# Patient Record
Sex: FEMALE | Race: ASIAN | Employment: FULL TIME | RURAL
[De-identification: names, ages, dates, MRNs, and addresses within clinical notes are randomized per-mention and may not be internally consistent; named-entity substitution may affect disease eponyms.]

---

## 2017-01-05 ENCOUNTER — OFFICE VISIT (OUTPATIENT)
Dept: INTERNAL MEDICINE CLINIC | Age: 52
End: 2017-01-05

## 2017-01-05 VITALS
HEART RATE: 80 BPM | SYSTOLIC BLOOD PRESSURE: 149 MMHG | DIASTOLIC BLOOD PRESSURE: 97 MMHG | RESPIRATION RATE: 16 BRPM | OXYGEN SATURATION: 95 % | TEMPERATURE: 96.1 F | WEIGHT: 185 LBS | HEIGHT: 63 IN | BODY MASS INDEX: 32.78 KG/M2

## 2017-01-05 DIAGNOSIS — H60.312 DIFFUSE OTITIS EXTERNA OF LEFT EAR, UNSPECIFIED CHRONICITY: Primary | ICD-10-CM

## 2017-01-05 DIAGNOSIS — M72.2 PLANTAR FASCIA SYNDROME: ICD-10-CM

## 2017-01-05 DIAGNOSIS — R63.5 WEIGHT GAIN: ICD-10-CM

## 2017-01-05 DIAGNOSIS — J30.1 NON-SEASONAL ALLERGIC RHINITIS DUE TO POLLEN: ICD-10-CM

## 2017-01-05 RX ORDER — CETIRIZINE HCL 10 MG
TABLET ORAL
COMMUNITY
End: 2017-08-10 | Stop reason: ALTCHOICE

## 2017-01-05 RX ORDER — NEOMYCIN SULFATE, POLYMYXIN B SULFATE AND HYDROCORTISONE 10; 3.5; 1 MG/ML; MG/ML; [USP'U]/ML
3 SUSPENSION/ DROPS AURICULAR (OTIC) 3 TIMES DAILY
Qty: 10 ML | Refills: 0 | Status: SHIPPED | OUTPATIENT
Start: 2017-01-05 | End: 2017-01-06 | Stop reason: ALTCHOICE

## 2017-01-05 NOTE — PROGRESS NOTES
HISTORY OF PRESENT ILLNESS  Sadaf Shea is a 46 y.o. female. Ear Pain   The history is provided by the patient. This is a new problem. Episode onset: 1 week. The problem occurs hourly. The problem has been gradually improving. Associated symptoms comments: Left ear pain right OK  Rhinorrhea yellow . The symptoms are relieved by medications. Treatments tried: top witch hazel to ear Vesta pot. The treatment provided mild relief.    has gained some weight recently and would like her thyroid checked. Some complaints of both feet hurting times weeks pain first thing in the morning heel regions. Also relates her dog stepped on her foot. Current Outpatient Prescriptions   Medication Sig Dispense Refill    cetirizine (ZYRTEC) 10 mg tablet Take  by mouth.  loratadine (CLARITIN) 10 mg tablet Take 10 mg by mouth.  albuterol (PROVENTIL HFA, VENTOLIN HFA, PROAIR HFA) 90 mcg/actuation inhaler Take 1 Puff by inhalation every six (6) hours as needed for Wheezing. 1 Inhaler 1    hydrocortisone (HYTONE) 2.5 % topical cream Apply  to affected area two (2) times a day. use thin layer 30 g 1    fluticasone (FLONASE) 50 mcg/actuation nasal spray USE TWO SPRAYS IN EACH NOSTRIL EVERY DAY 1 Bottle 12    OTHER,NON-FORMULARY, 1 Cap daily. Greens herbals       ofloxacin (FLOXIN) 0.3 % otic solution Administer 5 Drops in left ear two (2) times a day. 5 mL 0       Social History     Social History    Marital status:      Spouse name: N/A    Number of children: 2    Years of education: N/A     Occupational History     [employer]     Social History Main Topics    Smoking status: Never Smoker    Smokeless tobacco: Never Used    Alcohol use No    Drug use: No    Sexual activity: Not Currently     Other Topics Concern    Not on file     Social History Narrative     2 kids       Review of Systems   Constitutional: Negative for fever. HENT: Positive for ear pain.     Gastrointestinal: Positive for nausea. Negative for vomiting. Physical Exam  Visit Vitals    BP (!) 149/97 (BP 1 Location: Left arm, BP Patient Position: Sitting)    Pulse 80    Temp 96.1 °F (35.6 °C) (Oral)    Resp 16    Ht 5' 2.5\" (1.588 m)    Wt 185 lb (83.9 kg)    LMP  (Approximate)    SpO2 95%    BMI 33.3 kg/m2       WDWN NAD  TM clear, throat wnl  Neck no adenopathy  Heart RRR no C/M/R  Lungs CTA  Abdo soft non tender  Ext No redness swelling or edema    ASSESSMENT and PLAN  Encounter Diagnoses   Name Primary?  Diffuse otitis externa of left ear, unspecified chronicity Yes    Weight gain     Non-seasonal allergic rhinitis due to pollen     Plantar fascia syndrome      Orders Placed This Encounter    TSH 3RD GENERATION    CBC W/O DIFF    METABOLIC PANEL, BASIC    T4, FREE    cetirizine (ZYRTEC) 10 mg tablet    DISCONTD: neomycin-polymyxin-hydrocortisone, buffered, (PEDIOTIC) 3.5-10,000-1 mg/mL-unit/mL-% otic suspension    ofloxacin drops prescribed for her ear 4 drops twice daily for a week. Discussed plantar fasciitis recommended insoles and exercises. Follow-up Disposition:  Return in about 6 months (around 7/5/2017), or if symptoms worsen or fail to improve.

## 2017-01-05 NOTE — MR AVS SNAPSHOT
Visit Information Date & Time Provider Department Dept. Phone Encounter #  
 1/5/2017  9:00 AM Baldomero Oropeza MD [place] [id number] [phone] [id number] Follow-up Instructions Return in about 6 months (around 7/5/2017), or if symptoms worsen or fail to improve. Upcoming Health Maintenance Date Due INFLUENZA AGE 9 TO ADULT 4/30/2017* PAP AKA CERVICAL CYTOLOGY 7/24/2017 FOBT Q 1 YEAR AGE 50-75 8/16/2017 BREAST CANCER SCRN MAMMOGRAM 9/8/2018 DTaP/Tdap/Td series (2 - Td) 7/24/2024 *Topic was postponed. The date shown is not the original due date. Allergies as of 1/5/2017  Review Complete On: 1/5/2017 By: Baldomero Oropeza MD  
  
 Severity Noted Reaction Type Reactions Codeine  08/22/2013    Itching Current Immunizations  Reviewed on 11/15/2016 Name Date Tdap 7/24/2014 Not reviewed this visit You Were Diagnosed With   
  
 Codes Comments Diffuse otitis externa of left ear, unspecified chronicity    -  Primary ICD-10-CM: J12.295 ICD-9-CM: 380.10 Weight gain     ICD-10-CM: R63.5 ICD-9-CM: 783.1 Non-seasonal allergic rhinitis due to pollen     ICD-10-CM: J30.1 ICD-9-CM: 477.0 Plantar fascia syndrome     ICD-10-CM: M72.2 ICD-9-CM: 728.71 Vitals BP Pulse Temp Resp Height(growth percentile) Weight(growth percentile) (!) 149/97 (BP 1 Location: Left arm, BP Patient Position: Sitting) 80 96.1 °F (35.6 °C) (Oral) 16 5' 2.5\" (1.588 m) 185 lb (83.9 kg) LMP SpO2 BMI OB Status Smoking Status (Approximate) 95% 33.3 kg/m2 Hysterectomy Never Smoker BMI and BSA Data Body Mass Index Body Surface Area  
 33.3 kg/m 2 1.92 m 2 Preferred Pharmacy Pharmacy Name Phone [facility] [address] & [facility] [phone] Your Updated Medication List  
  
   
This list is accurate as of: 1/5/17 10:09 AM.  Always use your most recent med list.  
  
  
  
  
 albuterol 90 mcg/actuation inhaler Commonly known as:  PROVENTIL HFA, VENTOLIN HFA, PROAIR HFA Take 1 Puff by inhalation every six (6) hours as needed for Wheezing. fluticasone 50 mcg/actuation nasal spray Commonly known as:  FLONASE  
USE TWO SPRAYS IN EACH NOSTRIL EVERY DAY  
  
 hydrocortisone 2.5 % topical cream  
Commonly known as:  HYTONE Apply  to affected area two (2) times a day. use thin layer  
  
 loratadine 10 mg tablet Commonly known as:  Jearline Rudolph Take 10 mg by mouth. neomycin-polymyxin-hydrocortisone (buffered) 3.5-10,000-1 mg/mL-unit/mL-% otic suspension Commonly known as:  Allayne Brian Administer 3 Drops in left ear three (3) times daily for 7 days. OTHER(NON-FORMULARY) 1 Cap daily. Greens herbals ZyrTEC 10 mg tablet Generic drug:  cetirizine Take  by mouth. Prescriptions Sent to Pharmacy Refills  
 neomycin-polymyxin-hydrocortisone, buffered, (PEDIOTIC) 3.5-10,000-1 mg/mL-unit/mL-% otic suspension 0 Sig: Administer 3 Drops in left ear three (3) times daily for 7 days. Class: Normal  
 Pharmacy: [place] [address] [address] [facility] Ph #: [phone] Route: Left Ear Follow-up Instructions Return in about 6 months (around 7/5/2017), or if symptoms worsen or fail to improve. Patient Instructions Plantar Fasciitis: Exercises Your Care Instructions Here are some examples of typical rehabilitation exercises for your condition. Start each exercise slowly. Ease off the exercise if you start to have pain. Your doctor or physical therapist will tell you when you can start these exercises and which ones will work best for you. How to do the exercises Note: Each exercise should create a pulling feeling but should not cause pain. Towel stretch 1. Sit with your legs extended and knees straight. 2. Place a towel around your foot just under the toes. 3. Hold each end of the towel in each hand, with your hands above your knees. 4. Pull back with the towel so that your foot stretches toward you. 5. Hold the position for at least 15 to 30 seconds. 6. Repeat 2 to 4 times a session, up to 5 sessions a day. Calf stretch Note: This exercise stretches the muscles at the back of the lower leg (the calf) and the Achilles tendon. Do this exercise 3 or 4 times a day, 5 days a week. 1. Stand facing a wall with your hands on the wall at about eye level. Put the leg you want to stretch about a step behind your other leg. 2. Keeping your back heel on the floor, bend your front knee until you feel a stretch in the back leg. 3. Hold the stretch for 15 to 30 seconds. Repeat 2 to 4 times. Plantar fascia and calf stretch Note: Stretching the plantar fascia and calf muscles can increase flexibility and decrease heel pain. You can do this exercise several times each day and before and after activity. 1. Stand on a step as shown above. Be sure to hold on to the banister. 2. Slowly let your heels down over the edge of the step as you relax your calf muscles. You should feel a gentle stretch across the bottom of your foot and up the back of your leg to your knee. 3. Hold the stretch about 15 to 30 seconds, and then tighten your calf muscle a little to bring your heel back up to the level of the step. Repeat 2 to 4 times. Towel curls 1. While sitting, place your foot on a towel on the floor and scrunch the towel toward you with your toes. 2. Then, also using your toes, push the towel away from you. Note: Make this exercise more challenging by placing a weighted object, such as a soup can, on the other end of the towel. [place] pickups 1. Put marbles on the floor next to a cup. 
2. Using your toes, try to lift the marbles up from the floor and put them in the cup. Follow-up care is a key part of your treatment and safety.  Be sure to make and go to all appointments, and call your doctor if you are having problems. It's also a good idea to know your test results and keep a list of the medicines you take. Where can you learn more? Go to http://nely-sandie.info/. Amy Lombardi in the search box to learn more about \"Plantar Fasciitis: Exercises. \" Current as of: May 23, 2016 Content Version: 11.1 © 8165-0375 [employer]. Care instructions adapted under license by [employer] (which disclaims liability or warranty for this information). If you have questions about a medical condition or this instruction, always ask your healthcare professional. [address] any warranty or liability for your use of this information. Introducing [facility]! Ken Hargrove introduces Travelogy patient portal. Now you can access parts of your medical record, email your doctor's office, and request medication refills online. 1. In your internet browser, go to [email or web address] 2. Click on the First Time User? Click Here link in the Sign In box. You will see the New Member Sign Up page. 3. Enter your Travelogy Access Code exactly as it appears below. You will not need to use this code after youve completed the sign-up process. If you do not sign up before the expiration date, you must request a new code. · Travelogy Access Code: [email or web address] Expires: 2/13/2017  4:19 PM 
 
4. Enter the last four digits of your Social Security Number (xxxx) and Date of Birth (mm/dd/yyyy) as indicated and click Submit. You will be taken to the next sign-up page. 5. Create a Piximt ID. This will be your Travelogy login ID and cannot be changed, so think of one that is secure and easy to remember. 6. Create a Travelogy password. You can change your password at any time. 7. Enter your Password Reset Question and Answer. This can be used at a later time if you forget your password. 8. Enter your e-mail address. You will receive e-mail notification when new information is available in [address] 9. Click Sign Up. You can now view and download portions of your medical record. 10. Click the Download Summary menu link to download a portable copy of your medical information. If you have questions, please visit the Frequently Asked Questions section of the [employer] website. Remember, [employer]igm is NOT to be used for urgent needs. For medical emergencies, dial 911. Now available from your iPhone and Android! Please provide this summary of care documentation to your next provider. Your primary care clinician is listed as Jacqueline Gupta. If you have any questions after today's visit, please call [phone].

## 2017-01-05 NOTE — PATIENT INSTRUCTIONS
Plantar Fasciitis: Exercises  Your Care Instructions  Here are some examples of typical rehabilitation exercises for your condition. Start each exercise slowly. Ease off the exercise if you start to have pain. Your doctor or physical therapist will tell you when you can start these exercises and which ones will work best for you. How to do the exercises  Note: Each exercise should create a pulling feeling but should not cause pain. Towel stretch    1. Sit with your legs extended and knees straight. 2. Place a towel around your foot just under the toes. 3. Hold each end of the towel in each hand, with your hands above your knees. 4. Pull back with the towel so that your foot stretches toward you. 5. Hold the position for at least 15 to 30 seconds. 6. Repeat 2 to 4 times a session, up to 5 sessions a day. Calf stretch    Note: This exercise stretches the muscles at the back of the lower leg (the calf) and the Achilles tendon. Do this exercise 3 or 4 times a day, 5 days a week. 1. Stand facing a wall with your hands on the wall at about eye level. Put the leg you want to stretch about a step behind your other leg. 2. Keeping your back heel on the floor, bend your front knee until you feel a stretch in the back leg. 3. Hold the stretch for 15 to 30 seconds. Repeat 2 to 4 times. Plantar fascia and calf stretch    Note: Stretching the plantar fascia and calf muscles can increase flexibility and decrease heel pain. You can do this exercise several times each day and before and after activity. 1. Stand on a step as shown above. Be sure to hold on to the banister. 2. Slowly let your heels down over the edge of the step as you relax your calf muscles. You should feel a gentle stretch across the bottom of your foot and up the back of your leg to your knee. 3. Hold the stretch about 15 to 30 seconds, and then tighten your calf muscle a little to bring your heel back up to the level of the step.  Repeat 2 to 4 times. Towel curls    1. While sitting, place your foot on a towel on the floor and scrunch the towel toward you with your toes. 2. Then, also using your toes, push the towel away from you. Note: Make this exercise more challenging by placing a weighted object, such as a soup can, on the other end of the towel. [place] pickups    1. Put marbles on the floor next to a cup.  2. Using your toes, try to lift the marbles up from the floor and put them in the cup. Follow-up care is a key part of your treatment and safety. Be sure to make and go to all appointments, and call your doctor if you are having problems. It's also a good idea to know your test results and keep a list of the medicines you take. Where can you learn more? Go to http://nely-sandie.info/. Alda Show in the search box to learn more about \"Plantar Fasciitis: Exercises. \"  Current as of: May 23, 2016  Content Version: 11.1  © 5710-5613 [employer]. Care instructions adapted under license by [employer] (which disclaims liability or warranty for this information). If you have questions about a medical condition or this instruction, always ask your healthcare professional. [address] any warranty or liability for your use of this information.

## 2017-01-05 NOTE — ACP (ADVANCE CARE PLANNING)
No pain, no chest pain, no shortness of breath, no bleeding, no redness, and no swelling at injection site.     Pt stated that they DO NOT HAVE AN ADVANCED DIRECTIVE

## 2017-01-06 ENCOUNTER — TELEPHONE (OUTPATIENT)
Dept: INTERNAL MEDICINE CLINIC | Age: 52
End: 2017-01-06

## 2017-01-06 LAB
BUN SERPL-MCNC: 13 MG/DL (ref 6–24)
BUN/CREAT SERPL: 19 (ref 9–23)
CALCIUM SERPL-MCNC: 9.3 MG/DL (ref 8.7–10.2)
CHLORIDE SERPL-SCNC: 102 MMOL/L (ref 96–106)
CO2 SERPL-SCNC: 21 MMOL/L (ref 18–29)
CREAT SERPL-MCNC: 0.69 MG/DL (ref 0.57–1)
ERYTHROCYTE [DISTWIDTH] IN BLOOD BY AUTOMATED COUNT: 13.4 % (ref 12.3–15.4)
GLUCOSE SERPL-MCNC: 88 MG/DL (ref 65–99)
HCT VFR BLD AUTO: 44.6 % (ref 34–46.6)
HGB BLD-MCNC: 15 G/DL (ref 11.1–15.9)
MCH RBC QN AUTO: 30.1 PG (ref 26.6–33)
MCHC RBC AUTO-ENTMCNC: 33.6 G/DL (ref 31.5–35.7)
MCV RBC AUTO: 90 FL (ref 79–97)
PLATELET # BLD AUTO: 224 X10E3/UL (ref 150–379)
POTASSIUM SERPL-SCNC: 4.4 MMOL/L (ref 3.5–5.2)
RBC # BLD AUTO: 4.98 X10E6/UL (ref 3.77–5.28)
SODIUM SERPL-SCNC: 142 MMOL/L (ref 134–144)
T4 FREE SERPL-MCNC: 1.18 NG/DL (ref 0.82–1.77)
TSH SERPL DL<=0.005 MIU/L-ACNC: 1.51 UIU/ML (ref 0.45–4.5)
WBC # BLD AUTO: 7.6 X10E3/UL (ref 3.4–10.8)

## 2017-01-06 RX ORDER — OFLOXACIN 3 MG/ML
5 SOLUTION AURICULAR (OTIC) 2 TIMES DAILY
Qty: 5 ML | Refills: 0 | Status: SHIPPED | OUTPATIENT
Start: 2017-01-06 | End: 2017-08-10 | Stop reason: ALTCHOICE

## 2017-01-06 NOTE — TELEPHONE ENCOUNTER
Pediotic is no longer made - can you please prescribe a different medication?   Send to [address], LPN  1/9/1239  5:64 AM

## 2017-01-06 NOTE — PROGRESS NOTES
Discussed possible side affects, precautions, and drug interactions and possible benefits of the medication(s).

## 2017-01-13 ENCOUNTER — TELEPHONE (OUTPATIENT)
Dept: INTERNAL MEDICINE CLINIC | Age: 52
End: 2017-01-13

## 2017-01-13 NOTE — TELEPHONE ENCOUNTER
Called pt back and after using new ear drops, she is dizzy and has almost fallen down in the shower and off cough. Now only yusing 2 drops in each ear instead of 5 drops as prescribed.   Pt stated ears are worst.

## 2017-01-13 NOTE — TELEPHONE ENCOUNTER
Call pt back and told her to stop the drops if SX presist, make an appt for Monday. Pt stated she is feeling better.

## 2017-01-13 NOTE — TELEPHONE ENCOUNTER
----- Message from Elma [place] sent at 1/13/2017  8:19 AM EST -----  Regarding: Dr. Lee Phillips telephone  Contact: [phone]  Pt is requesting a call regarding an ear drop that was prescribed. Pt stated she is losing balance and nausea. Pt is using half the recommended dosage right now. Pt best contact number is [phone]. Ca leave voicemail if no answer.

## 2017-08-10 ENCOUNTER — OFFICE VISIT (OUTPATIENT)
Dept: INTERNAL MEDICINE CLINIC | Age: 52
End: 2017-08-10

## 2017-08-10 VITALS
WEIGHT: 183 LBS | HEIGHT: 63 IN | TEMPERATURE: 96.4 F | DIASTOLIC BLOOD PRESSURE: 98 MMHG | BODY MASS INDEX: 32.43 KG/M2 | HEART RATE: 72 BPM | SYSTOLIC BLOOD PRESSURE: 156 MMHG | OXYGEN SATURATION: 96 % | RESPIRATION RATE: 19 BRPM

## 2017-08-10 DIAGNOSIS — Z00.00 WELL WOMAN EXAM (NO GYNECOLOGICAL EXAM): ICD-10-CM

## 2017-08-10 DIAGNOSIS — M72.2 PLANTAR FASCIA SYNDROME: ICD-10-CM

## 2017-08-10 DIAGNOSIS — L30.9 ECZEMA, UNSPECIFIED TYPE: ICD-10-CM

## 2017-08-10 DIAGNOSIS — Z00.00 WELL ADULT EXAM: Primary | ICD-10-CM

## 2017-08-10 DIAGNOSIS — I10 ESSENTIAL HYPERTENSION: ICD-10-CM

## 2017-08-10 RX ORDER — HYDROCHLOROTHIAZIDE 25 MG/1
25 TABLET ORAL DAILY
Qty: 30 TAB | Refills: 5 | Status: SHIPPED | OUTPATIENT
Start: 2017-08-10 | End: 2017-08-24 | Stop reason: SINTOL

## 2017-08-10 NOTE — PATIENT INSTRUCTIONS
Plantar Fasciitis: Exercises  Your Care Instructions  Here are some examples of typical rehabilitation exercises for your condition. Start each exercise slowly. Ease off the exercise if you start to have pain. Your doctor or physical therapist will tell you when you can start these exercises and which ones will work best for you. How to do the exercises  Note: Each exercise should create a pulling feeling but should not cause pain. Towel stretch    1. Sit with your legs extended and knees straight. 2. Place a towel around your foot just under the toes. 3. Hold each end of the towel in each hand, with your hands above your knees. 4. Pull back with the towel so that your foot stretches toward you. 5. Hold the position for at least 15 to 30 seconds. 6. Repeat 2 to 4 times a session, up to 5 sessions a day. Calf stretch    Note: This exercise stretches the muscles at the back of the lower leg (the calf) and the Achilles tendon. Do this exercise 3 or 4 times a day, 5 days a week. 1. Stand facing a wall with your hands on the wall at about eye level. Put the leg you want to stretch about a step behind your other leg. 2. Keeping your back heel on the floor, bend your front knee until you feel a stretch in the back leg. 3. Hold the stretch for 15 to 30 seconds. Repeat 2 to 4 times. Plantar fascia and calf stretch    Note: Stretching the plantar fascia and calf muscles can increase flexibility and decrease heel pain. You can do this exercise several times each day and before and after activity. 1. Stand on a step as shown above. Be sure to hold on to the banister. 2. Slowly let your heels down over the edge of the step as you relax your calf muscles. You should feel a gentle stretch across the bottom of your foot and up the back of your leg to your knee. 3. Hold the stretch about 15 to 30 seconds, and then tighten your calf muscle a little to bring your heel back up to the level of the step.  Repeat 2 to 4 times. Towel curls    1. While sitting, place your foot on a towel on the floor and scrunch the towel toward you with your toes. 2. Then, also using your toes, push the towel away from you. Note: Make this exercise more challenging by placing a weighted object, such as a soup can, on the other end of the towel. [place] pickups    1. Put marbles on the floor next to a cup.  2. Using your toes, try to lift the marbles up from the floor and put them in the cup. Follow-up care is a key part of your treatment and safety. Be sure to make and go to all appointments, and call your doctor if you are having problems. It's also a good idea to know your test results and keep a list of the medicines you take. Where can you learn more? Go to http://nelyCrestHiresandie.info/. Ashely Nice in the search box to learn more about \"Plantar Fasciitis: Exercises. \"  Current as of: March 21, 2017  Content Version: 11.3  © 5847-7362 [employer]. Care instructions adapted under license by [employer] (which disclaims liability or warranty for this information). If you have questions about a medical condition or this instruction, always ask your healthcare professional. [address] any warranty or liability for your use of this information. High Blood Pressure: Care Instructions  Your Care Instructions  If your blood pressure is usually above 140/90, you have high blood pressure, or hypertension. That means the top number is 140 or higher or the bottom number is 90 or higher, or both. Despite what a lot of people think, high blood pressure usually doesn't cause headaches or make you feel dizzy or lightheaded. It usually has no symptoms. But it does increase your risk for heart attack, stroke, and kidney or eye damage. The higher your blood pressure, the more your risk increases. Your doctor will give you a goal for your blood pressure.  Your goal will be based on your health and your age. An example of a goal is to keep your blood pressure below 140/90. Lifestyle changes, such as eating healthy and being active, are always important to help lower blood pressure. You might also take medicine to reach your blood pressure goal.  Follow-up care is a key part of your treatment and safety. Be sure to make and go to all appointments, and call your doctor if you are having problems. It's also a good idea to know your test results and keep a list of the medicines you take. How can you care for yourself at home? Medical treatment  · If you stop taking your medicine, your blood pressure will go back up. You may take one or more types of medicine to lower your blood pressure. Be safe with medicines. Take your medicine exactly as prescribed. Call your doctor if you think you are having a problem with your medicine. · Talk to your doctor before you start taking aspirin every day. Aspirin can help certain people lower their risk of a heart attack or stroke. But taking aspirin isn't right for everyone, because it can cause serious bleeding. · See your doctor regularly. You may need to see the doctor more often at first or until your blood pressure comes down. · If you are taking blood pressure medicine, talk to your doctor before you take decongestants or anti-inflammatory medicine, such as ibuprofen. Some of these medicines can raise blood pressure. · Learn how to check your blood pressure at home. Lifestyle changes  · Stay at a healthy weight. This is especially important if you put on weight around the waist. Losing even 10 pounds can help you lower your blood pressure. · If your doctor recommends it, get more exercise. Walking is a good choice. Bit by bit, increase the amount you walk every day. Try for at least 30 minutes on most days of the week. You also may want to swim, bike, or do other activities. · Avoid or limit alcohol.  Talk to your doctor about whether you can drink any alcohol. · Try to limit how much sodium you eat to less than 2,300 milligrams (mg) a day. Your doctor may ask you to try to eat less than 1,500 mg a day. · Eat plenty of fruits (such as bananas and oranges), vegetables, legumes, whole grains, and low-fat dairy products. · Lower the amount of saturated fat in your diet. Saturated fat is found in animal products such as milk, cheese, and meat. Limiting these foods may help you lose weight and also lower your risk for heart disease. · Do not smoke. Smoking increases your risk for heart attack and stroke. If you need help quitting, talk to your doctor about stop-smoking programs and medicines. These can increase your chances of quitting for good. When should you call for help? Call 911 anytime you think you may need emergency care. This may mean having symptoms that suggest that your blood pressure is causing a serious heart or blood vessel problem. Your blood pressure may be over 180/110. For example, call 911 if:  · You have symptoms of a heart attack. These may include:  ¨ Chest pain or pressure, or a strange feeling in the chest.  ¨ Sweating. ¨ Shortness of breath. ¨ Nausea or vomiting. ¨ Pain, pressure, or a strange feeling in the back, neck, jaw, or upper belly or in one or both shoulders or arms. ¨ Lightheadedness or sudden weakness. ¨ A fast or irregular heartbeat. · You have symptoms of a stroke. These may include:  ¨ Sudden numbness, tingling, weakness, or loss of movement in your face, arm, or leg, especially on only one side of your body. ¨ Sudden vision changes. ¨ Sudden trouble speaking. ¨ Sudden confusion or trouble understanding simple statements. ¨ Sudden problems with walking or balance. ¨ A sudden, severe headache that is different from past headaches. · You have severe back or belly pain. Do not wait until your blood pressure comes down on its own. Get help right away.   Call your doctor now or seek immediate care if:  · Your blood pressure is much higher than normal (such as 180/110 or higher), but you don't have symptoms. · You think high blood pressure is causing symptoms, such as:  ¨ Severe headache. ¨ Blurry vision. Watch closely for changes in your health, and be sure to contact your doctor if:  · Your blood pressure measures 140/90 or higher at least 2 times. That means the top number is 140 or higher or the bottom number is 90 or higher, or both. · You think you may be having side effects from your blood pressure medicine. · Your blood pressure is usually normal, but it goes above normal at least 2 times. Where can you learn more? Go to http://nely-sandie.info/. Enter L411 in the search box to learn more about \"High Blood Pressure: Care Instructions. \"  Current as of: August 8, 2016  Content Version: 11.3  © 7787-9458 [employer] Care instructions adapted under license by [employer] (which disclaims liability or warranty for this information). If you have questions about a medical condition or this instruction, always ask your healthcare professional. [place] [address] any warranty or liability for your use of this information.

## 2017-08-10 NOTE — PROGRESS NOTES
Subjective:   46 y.o. female for Well Woman Check. Her gyne and breast care is done elsewhere by her Ob-Gyne physician. Does not need a Pap because of her hysterectomy. Her last few blood pressures have been somewhat elevated but we have not put her on medicines and she has not been diagnosed with hypertension yet. In general feels well. Does not really follow any sort of diet. She continues to be somewhat overweight. Patient Active Problem List    Diagnosis Date Noted    Essential hypertension 08/10/2017    Eczema 08/19/2016    Allergic rhinitis 08/16/2016     Current Outpatient Prescriptions   Medication Sig Dispense Refill    hydroCHLOROthiazide (HYDRODIURIL) 25 mg tablet Take 1 Tab by mouth daily. 30 Tab 5    OTHER,NON-FORMULARY, 1 Cap daily. Greens herbals       loratadine (CLARITIN) 10 mg tablet Take 10 mg by mouth.  albuterol (PROVENTIL HFA, VENTOLIN HFA, PROAIR HFA) 90 mcg/actuation inhaler Take 1 Puff by inhalation every six (6) hours as needed for Wheezing. 1 Inhaler 1    hydrocortisone (HYTONE) 2.5 % topical cream Apply  to affected area two (2) times a day. use thin layer 30 g 1    fluticasone (FLONASE) 50 mcg/actuation nasal spray USE TWO SPRAYS IN EACH NOSTRIL EVERY DAY 1 Bottle 12     Allergies   Allergen Reactions    Codeine Itching     Past Medical History:   Diagnosis Date    Essential hypertension 8/10/2017    Psychiatric disorder     anxiety past hx     Past Surgical History:   Procedure Laterality Date    HX CHOLECYSTECTOMY      HX DILATION AND CURETTAGE      x2    HX GYN      fibroid tumor removal    HX PARTIAL HYSTERECTOMY  2011    fibroids     Family History   Problem Relation Age of Onset    Diabetes Father      Social History   Substance Use Topics    Smoking status: Never Smoker    Smokeless tobacco: Never Used    Alcohol use No             ROS: Feeling generally well. No TIA's or unusual headaches, no dysphagia. No prolonged cough.  No dyspnea or chest pain on exertion. No abdominal pain, change in bowel habits, black or bloody stools. No urinary tract symptoms. No new or unusual musculoskeletal symptoms. C/o plantar fascitis pain    Specific concerns today: heels hurt feet \"broken\" × 2 years. No specific trauma. Objective: The patient appears well, alert, oriented x 3, in no distress. Visit Vitals    BP (!) 156/98    Pulse 72    Temp 96.4 °F (35.8 °C) (Oral)    Resp 19    Ht 5' 2.5\" (1.588 m)    Wt 183 lb (83 kg)    LMP  (Approximate)    SpO2 96%    BMI 32.94 kg/m2     ENT normal.  Neck supple. No adenopathy or thyromegaly. MANJULA. Lungs are clear, good air entry, no wheezes, rhonchi or rales. S1 and S2 normal, no murmurs, regular rate and rhythm. Abdomen soft without tenderness, guarding, mass or organomegaly. Extremities show no edema, normal peripheral pulses. Neurological is normal, no focal findings. Breast and Pelvic exams are deferred. Feet look normal some tenderness in the heel region. Assessment/Plan:   Well Woman  lose weight  Encounter Diagnoses   Name Primary?  Well adult exam Yes    Plantar fascia syndrome     Essential hypertension     Well woman exam (no gynecological exam)     Comment: [V70.0]     Orders Placed This Encounter    hydroCHLOROthiazide (HYDRODIURIL) 25 mg tablet     Discussed possible side affects, precautions, and drug interactions and possible benefits of the medication(s). Given the elevated blood pressures over the last few visits she probably has hypertension and should start medications. She is in agreement. She will return in a couple weeks to have labs done and to finish her physical.  He would be good to reflect a better blood pressure reading. Exercise given to see if it helps with her feet. Follow-up Disposition:  Return in about 2 weeks (around 8/24/2017) for routine follow up.

## 2017-08-10 NOTE — MR AVS SNAPSHOT
Visit Information Date & Time Provider Department Dept. Phone Encounter #  
 8/10/2017  9:45 AM Yolande Paige  Amendrj Cardenas [id number] Follow-up Instructions Return in about 2 weeks (around 8/24/2017) for routine follow up. Upcoming Health Maintenance Date Due Pneumococcal 19-64 Medium Risk (1 of 1 - PPSV23) 10/10/1982 INFLUENZA AGE 9 TO ADULT 8/1/2017 FOBT Q 1 YEAR AGE 50-75 8/16/2017 BREAST CANCER SCRN MAMMOGRAM 9/8/2018 DTaP/Tdap/Td series (2 - Td) 7/24/2024 Allergies as of 8/10/2017  Review Complete On: 8/10/2017 By: Yolande Paige MD  
  
 Severity Noted Reaction Type Reactions Codeine  08/22/2013    Itching Current Immunizations  Reviewed on 8/10/2017 Name Date Tdap 7/24/2014 Reviewed by Yolande Paige MD on 8/10/2017 at 10:43 AM  
You Were Diagnosed With   
  
 Codes Comments Well adult exam    -  Primary ICD-10-CM: Z00.00 ICD-9-CM: V70.0 Plantar fascia syndrome     ICD-10-CM: M72.2 ICD-9-CM: 728.71 Essential hypertension     ICD-10-CM: I10 
ICD-9-CM: 401.9 Well woman exam (no gynecological exam)     ICD-10-CM: Z00.00 ICD-9-CM: V70.0 [V70.0] Vitals BP Pulse Temp Resp Height(growth percentile) Weight(growth percentile) (!) 156/98 72 96.4 °F (35.8 °C) (Oral) 19 5' 2.5\" (1.588 m) 183 lb (83 kg) LMP SpO2 BMI OB Status Smoking Status (Approximate) 96% 32.94 kg/m2 Hysterectomy Never Smoker Vitals History BMI and BSA Data Body Mass Index Body Surface Area 32.94 kg/m 2 1.91 m 2 Preferred Pharmacy Pharmacy Name Phone [facility] [address] [facility], [facility] [address] [facility] [phone] Your Updated Medication List  
  
   
This list is accurate as of: 8/10/17 11:00 AM.  Always use your most recent med list.  
  
  
  
  
 albuterol 90 mcg/actuation inhaler Commonly known as:  PROVENTIL HFA, VENTOLIN HFA, PROAIR HFA  
 Take 1 Puff by inhalation every six (6) hours as needed for Wheezing. fluticasone 50 mcg/actuation nasal spray Commonly known as:  FLONASE  
USE TWO SPRAYS IN EACH NOSTRIL EVERY DAY  
  
 hydroCHLOROthiazide 25 mg tablet Commonly known as:  HYDRODIURIL Take 1 Tab by mouth daily. hydrocortisone 2.5 % topical cream  
Commonly known as:  HYTONE Apply  to affected area two (2) times a day. use thin layer  
  
 loratadine 10 mg tablet Commonly known as:  [place] Take 10 mg by mouth. OTHER(NON-FORMULARY) 1 Cap daily. Greens herbals Prescriptions Sent to Pharmacy Refills  
 hydroCHLOROthiazide (HYDRODIURIL) 25 mg tablet 5 Sig: Take 1 Tab by mouth daily. Class: Normal  
 Pharmacy: [address], [facility] #: [phone] Route: Oral  
  
Follow-up Instructions Return in about 2 weeks (around 8/24/2017) for routine follow up. Patient Instructions Plantar Fasciitis: Exercises Your Care Instructions Here are some examples of typical rehabilitation exercises for your condition. Start each exercise slowly. Ease off the exercise if you start to have pain. Your doctor or physical therapist will tell you when you can start these exercises and which ones will work best for you. How to do the exercises Note: Each exercise should create a pulling feeling but should not cause pain. Towel stretch 1. Sit with your legs extended and knees straight. 2. Place a towel around your foot just under the toes. 3. Hold each end of the towel in each hand, with your hands above your knees. 4. Pull back with the towel so that your foot stretches toward you. 5. Hold the position for at least 15 to 30 seconds. 6. Repeat 2 to 4 times a session, up to 5 sessions a day. Calf stretch Note: This exercise stretches the muscles at the back of the lower leg (the calf) and the Achilles tendon. Do this exercise 3 or 4 times a day, 5 days a week. 1. Stand facing a wall with your hands on the wall at about eye level. Put the leg you want to stretch about a step behind your other leg. 2. Keeping your back heel on the floor, bend your front knee until you feel a stretch in the back leg. 3. Hold the stretch for 15 to 30 seconds. Repeat 2 to 4 times. Plantar fascia and calf stretch Note: Stretching the plantar fascia and calf muscles can increase flexibility and decrease heel pain. You can do this exercise several times each day and before and after activity. 1. Stand on a step as shown above. Be sure to hold on to the banister. 2. Slowly let your heels down over the edge of the step as you relax your calf muscles. You should feel a gentle stretch across the bottom of your foot and up the back of your leg to your knee. 3. Hold the stretch about 15 to 30 seconds, and then tighten your calf muscle a little to bring your heel back up to the level of the step. Repeat 2 to 4 times. Towel curls 1. While sitting, place your foot on a towel on the floor and scrunch the towel toward you with your toes. 2. Then, also using your toes, push the towel away from you. Note: Make this exercise more challenging by placing a weighted object, such as a soup can, on the other end of the towel. [place] pickups 1. Put marbles on the floor next to a cup. 
2. Using your toes, try to lift the marbles up from the floor and put them in the cup. Follow-up care is a key part of your treatment and safety. Be sure to make and go to all appointments, and call your doctor if you are having problems. It's also a good idea to know your test results and keep a list of the medicines you take. Where can you learn more? Go to http://nely-sandie.info/. Moose Velásquez in the search box to learn more about \"Plantar Fasciitis: Exercises. \" Current as of: March 21, 2017 Content Version: 11.3 © 9598-1393 [employer] Care instructions adapted under license by [employer] (which disclaims liability or warranty for this information). If you have questions about a medical condition or this instruction, always ask your healthcare professional. [address] any warranty or liability for your use of this information. High Blood Pressure: Care Instructions Your Care Instructions If your blood pressure is usually above 140/90, you have high blood pressure, or hypertension. That means the top number is 140 or higher or the bottom number is 90 or higher, or both. Despite what a lot of people think, high blood pressure usually doesn't cause headaches or make you feel dizzy or lightheaded. It usually has no symptoms. But it does increase your risk for heart attack, stroke, and kidney or eye damage. The higher your blood pressure, the more your risk increases. Your doctor will give you a goal for your blood pressure. Your goal will be based on your health and your age. An example of a goal is to keep your blood pressure below 140/90. Lifestyle changes, such as eating healthy and being active, are always important to help lower blood pressure. You might also take medicine to reach your blood pressure goal. 
Follow-up care is a key part of your treatment and safety. Be sure to make and go to all appointments, and call your doctor if you are having problems. It's also a good idea to know your test results and keep a list of the medicines you take. How can you care for yourself at home? Medical treatment · If you stop taking your medicine, your blood pressure will go back up. You may take one or more types of medicine to lower your blood pressure. Be safe with medicines. Take your medicine exactly as prescribed. Call your doctor if you think you are having a problem with your medicine. · Talk to your doctor before you start taking aspirin every day. Aspirin can help certain people lower their risk of a heart attack or stroke. But taking aspirin isn't right for everyone, because it can cause serious bleeding. · See your doctor regularly. You may need to see the doctor more often at first or until your blood pressure comes down. · If you are taking blood pressure medicine, talk to your doctor before you take decongestants or anti-inflammatory medicine, such as ibuprofen. Some of these medicines can raise blood pressure. · Learn how to check your blood pressure at home. Lifestyle changes · Stay at a healthy weight. This is especially important if you put on weight around the waist. Losing even 10 pounds can help you lower your blood pressure. · If your doctor recommends it, get more exercise. Walking is a good choice. Bit by bit, increase the amount you walk every day. Try for at least 30 minutes on most days of the week. You also may want to swim, bike, or do other activities. · Avoid or limit alcohol. Talk to your doctor about whether you can drink any alcohol. · Try to limit how much sodium you eat to less than 2,300 milligrams (mg) a day. Your doctor may ask you to try to eat less than 1,500 mg a day. · Eat plenty of fruits (such as bananas and oranges), vegetables, legumes, whole grains, and low-fat dairy products. · Lower the amount of saturated fat in your diet. Saturated fat is found in animal products such as milk, cheese, and meat. Limiting these foods may help you lose weight and also lower your risk for heart disease. · Do not smoke. Smoking increases your risk for heart attack and stroke. If you need help quitting, talk to your doctor about stop-smoking programs and medicines. These can increase your chances of quitting for good. When should you call for help? Call 911 anytime you think you may need emergency care.  This may mean having symptoms that suggest that your blood pressure is causing a serious heart or blood vessel problem. Your blood pressure may be over 180/110. For example, call 911 if: 
· You have symptoms of a heart attack. These may include: ¨ Chest pain or pressure, or a strange feeling in the chest. 
¨ Sweating. ¨ Shortness of breath. ¨ Nausea or vomiting. ¨ Pain, pressure, or a strange feeling in the back, neck, jaw, or upper belly or in one or both shoulders or arms. ¨ Lightheadedness or sudden weakness. ¨ A fast or irregular heartbeat. · You have symptoms of a stroke. These may include: 
¨ Sudden numbness, tingling, weakness, or loss of movement in your face, arm, or leg, especially on only one side of your body. ¨ Sudden vision changes. ¨ Sudden trouble speaking. ¨ Sudden confusion or trouble understanding simple statements. ¨ Sudden problems with walking or balance. ¨ A sudden, severe headache that is different from past headaches. · You have severe back or belly pain. Do not wait until your blood pressure comes down on its own. Get help right away. Call your doctor now or seek immediate care if: 
· Your blood pressure is much higher than normal (such as 180/110 or higher), but you don't have symptoms. · You think high blood pressure is causing symptoms, such as: ¨ Severe headache. ¨ Blurry vision. Watch closely for changes in your health, and be sure to contact your doctor if: 
· Your blood pressure measures 140/90 or higher at least 2 times. That means the top number is 140 or higher or the bottom number is 90 or higher, or both. · You think you may be having side effects from your blood pressure medicine. · Your blood pressure is usually normal, but it goes above normal at least 2 times. Where can you learn more? Go to http://nely-sandie.info/. Enter E814 in the search box to learn more about \"High Blood Pressure: Care Instructions. \" Current as of: August 8, 2016 Content Version: 11.3 © 3649-9426 [employer], [employer]. Care instructions adapted under license by [employer] (which disclaims liability or warranty for this information). If you have questions about a medical condition or this instruction, always ask your healthcare professional. [address] any warranty or liability for your use of this information. Introducing [facility] & HEALTH SERVICES! Alejandra Orlando introduces [employer] patient portal. Now you can access parts of your medical record, email your doctor's office, and request medication refills online. 1. In your internet browser, go to [email or web address] 2. Click on the First Time User? Click Here link in the Sign In box. You will see the New Member Sign Up page. 3. Enter your [employer] Access Code exactly as it appears below. You will not need to use this code after youve completed the sign-up process. If you do not sign up before the expiration date, you must request a new code. · [employer] Access Code: [email or web address] Expires: 11/8/2017  9:41 AM 
 
4. Enter the last four digits of your Social Security Number (xxxx) and Date of Birth (mm/dd/yyyy) as indicated and click Submit. You will be taken to the next sign-up page. 5. Create a [employer] ID. This will be your [employer] login ID and cannot be changed, so think of one that is secure and easy to remember. 6. Create a [employer] password. You can change your password at any time. 7. Enter your Password Reset Question and Answer. This can be used at a later time if you forget your password. 8. Enter your e-mail address. You will receive e-mail notification when new information is available in [address] 9. Click Sign Up. You can now view and download portions of your medical record. 10. Click the Download Summary menu link to download a portable copy of your medical information. If you have questions, please visit the Frequently Asked Questions section of the Eye[employer] website. Remember, One[employer] is NOT to be used for urgent needs. For medical emergencies, dial 911. Now available from your iPhone and Android! Please provide this summary of care documentation to your next provider. Your primary care clinician is listed as Kathryne Jeans. If you have any questions after today's visit, please call [phone].

## 2017-08-11 ENCOUNTER — TELEPHONE (OUTPATIENT)
Dept: INTERNAL MEDICINE CLINIC | Age: 52
End: 2017-08-11

## 2017-08-11 NOTE — TELEPHONE ENCOUNTER
Chief Complaint   Patient presents with    Medication Problem     HCTZ     Patient states that the medication is not really helping her. She informs me that from 6:30 am- 11 am this morning; that she had not have a single urination. She is complaining of back flank pain and she believes that it is affecting her bowel movements. The patient is also feeling nauseous.

## 2017-08-11 NOTE — TELEPHONE ENCOUNTER
Took 1 HCTZ, C/o nausea and can't pee and back pain, hold hctz 4 now. Can t,ry again later if Sx re-occur can call for an alternative med.

## 2017-08-11 NOTE — TELEPHONE ENCOUNTER
----- Message from Colleen Bruner sent at 8/11/2017  2:32 PM EDT -----  Regarding: /Telephone  Pt is calling to advised  that the medication he has her on is not doing what he said it's suppose to do. Pt states, she is not passing urine as normal ,nasusa and her kidneys hurt. please call. Best contact number is [phone].

## 2017-08-24 ENCOUNTER — OFFICE VISIT (OUTPATIENT)
Dept: INTERNAL MEDICINE CLINIC | Age: 52
End: 2017-08-24

## 2017-08-24 VITALS
RESPIRATION RATE: 20 BRPM | BODY MASS INDEX: 32.43 KG/M2 | OXYGEN SATURATION: 97 % | HEIGHT: 63 IN | TEMPERATURE: 96.7 F | WEIGHT: 183 LBS | DIASTOLIC BLOOD PRESSURE: 82 MMHG | HEART RATE: 78 BPM | SYSTOLIC BLOOD PRESSURE: 125 MMHG

## 2017-08-24 DIAGNOSIS — J30.1 SEASONAL ALLERGIC RHINITIS DUE TO POLLEN: ICD-10-CM

## 2017-08-24 DIAGNOSIS — Z13.1 SCREENING FOR DIABETES MELLITUS: ICD-10-CM

## 2017-08-24 DIAGNOSIS — Z12.11 SCREENING FOR COLON CANCER: ICD-10-CM

## 2017-08-24 DIAGNOSIS — I10 ESSENTIAL HYPERTENSION: Primary | ICD-10-CM

## 2017-08-24 DIAGNOSIS — Z13.220 SCREENING CHOLESTEROL LEVEL: ICD-10-CM

## 2017-08-24 NOTE — PROGRESS NOTES
Subjective:     Elayne Butts is a 46 y.o. female who presents for follow up of hypertension. New concerns: stopped HCTZ when stopped urinating and kidneys started hurting, Sx cleared after stopping meds. Inc water and changed to bottled water. Back from vacation. Bp now nl. Doesn't follow low chol diet. Current Outpatient Prescriptions   Medication Sig Dispense Refill    loratadine (CLARITIN) 10 mg tablet Take 10 mg by mouth.  albuterol (PROVENTIL HFA, VENTOLIN HFA, PROAIR HFA) 90 mcg/actuation inhaler Take 1 Puff by inhalation every six (6) hours as needed for Wheezing. 1 Inhaler 1    hydrocortisone (HYTONE) 2.5 % topical cream Apply  to affected area two (2) times a day. use thin layer 30 g 1    fluticasone (FLONASE) 50 mcg/actuation nasal spray USE TWO SPRAYS IN EACH NOSTRIL EVERY DAY 1 Bottle 12    OTHER,NON-FORMULARY, 1 Cap daily. Greens herbals        Allergies   Allergen Reactions    Codeine Itching    Hydrochlorothiazide Myalgia       Diet and Lifestyle: generally follows a low sodium diet, switch to bottle water back from vacation    Cardiovascular ROS: not taking medications regularly as instructed, side effects noted by patient include myalgias, no TIA's, no chest pain on exertion, no dyspnea on exertion, no swelling of ankles. Review of Systems, additional:  Pertinent items are noted in HPI. Social History   Substance Use Topics    Smoking status: Never Smoker    Smokeless tobacco: Never Used    Alcohol use No                 Objective:     Physical exam significant for the following: head congested  Visit Vitals    /82 (BP 1 Location: Left arm, BP Patient Position: At rest)    Pulse 78    Temp 96.7 °F (35.9 °C) (Oral)    Resp 20    Ht 5' 2.5\" (1.588 m)    Wt 183 lb (83 kg)    LMP  (Approximate)    SpO2 97%    BMI 32.94 kg/m2     Appearance: alert, well appearing, and in no distress.   General exam: CVS exam BP noted to be well controlled today in office, S1, S2 normal, no gallop, no murmur, chest clear, no JVD, no HSM, no edema. .   Assessment/Plan:     hypertension stable, off meds. .In cleritin to BID    ICD-10-CM ICD-9-CM    1. Essential hypertension I10 401.9    2. Screening cholesterol level Z13.220 V77.91 LIPID PANEL   3. Screening for diabetes mellitus C75.6 P74.0 METABOLIC PANEL, BASIC   4. Seasonal allergic rhinitis due to pollen J30.1 477.0      Orders Placed This Encounter    METABOLIC PANEL, BASIC    LIPID PANEL       For your allergies use an over the counter preparation like Allegra or Zyrtec which is less sedating then Benedryl. In addition Flonase or Nasacort which are steroid nasal sprays are also available OTC. Call me if the symptoms continue or worsen. Will fill out paper work when labs return. Follow-up Disposition:  Return in about 3 months (around 11/24/2017) for routine follow up.

## 2017-08-24 NOTE — LETTER
8/30/2017 11:27 AM 
 
Ms. Viola Taylor [address] [facility] [address] Dear Viola Taylor: 
 
Please find your most recent results below. Resulted Orders METABOLIC PANEL, BASIC Result Value Ref Range Glucose 93 65 - 99 mg/dL BUN 13 6 - 24 mg/dL Creatinine 0.62 0.57 - 1.00 mg/dL GFR est non- >59 mL/min/1.73 GFR est  >59 mL/min/1.73  
 BUN/Creatinine ratio 21 9 - 23 Sodium 141 134 - 144 mmol/L Potassium 4.2 3.5 - 5.2 mmol/L Chloride 103 96 - 106 mmol/L  
 CO2 20 18 - 29 mmol/L Calcium 9.3 8.7 - 10.2 mg/dL Narrative Performed at:  [address]  [id number] : Rai Aguilar MD, Phone:  [phone] LIPID PANEL Result Value Ref Range Cholesterol, total 148 100 - 199 mg/dL Triglyceride 155 (H) 0 - 149 mg/dL HDL Cholesterol 57 >39 mg/dL VLDL, calculated 31 5 - 40 mg/dL LDL, calculated 60 0 - 99 mg/dL Narrative Performed at:  [address]  [id number] : Rai Aguilar MD, Phone:  [phone] OCCULT BLOOD, IMMUNOASSAY (FIT) Result Value Ref Range Occult Blood fecal, by IA CANCELED Comment:  
   Result canceled by the ancillary Narrative Performed at:  [address]  [id number] : Rai Aguilar MD, Phone:  [phone] CVD REPORT Result Value Ref Range INTERPRETATION Note Comment:  
   Supplement report is available. Narrative Performed at:  [address] [place] [address]  [id number] : Jonathon Early PhD, Phone:  [phone] RECOMMENDATIONS: 
The results of your most recent labs show normal/ stable results. Please follow up as scheduled. Please call me if you have any questions: [phone] Sincerely, Twan Weathers MD

## 2017-08-24 NOTE — PROGRESS NOTES
Follow up for blood pressure - also need form completed for biometrics exam for work - see form  Arsenio Velázquez LPN  1/61/4439  1:62 AM

## 2017-08-24 NOTE — MR AVS SNAPSHOT
Visit Information Date & Time Provider Department Dept. Phone Encounter #  
 8/24/2017  9:30 AM Esme Still MD [place] [address] [phone] [id number] Follow-up Instructions Return in about 3 months (around 11/24/2017) for routine follow up. Upcoming Health Maintenance Date Due FOBT Q 1 YEAR AGE 50-75 8/16/2017 BREAST CANCER SCRN MAMMOGRAM 9/8/2018 DTaP/Tdap/Td series (2 - Td) 7/24/2024 Allergies as of 8/24/2017  Review Complete On: 8/24/2017 By: Lulu Marte LPN Severity Noted Reaction Type Reactions Codeine  08/22/2013    Itching Hydrochlorothiazide  08/24/2017    Myalgia Current Immunizations  Reviewed on 8/10/2017 Name Date Tdap 7/24/2014 Not reviewed this visit You Were Diagnosed With   
  
 Codes Comments Essential hypertension    -  Primary ICD-10-CM: I10 
ICD-9-CM: 401.9 Screening cholesterol level     ICD-10-CM: D63.707 ICD-9-CM: V77.91 Screening for diabetes mellitus     ICD-10-CM: Z13.1 ICD-9-CM: V77.1 Seasonal allergic rhinitis due to pollen     ICD-10-CM: J30.1 ICD-9-CM: 477.0 Screening for colon cancer     ICD-10-CM: Z12.11 ICD-9-CM: V76.51 Vitals BP Pulse Temp Resp Height(growth percentile) Weight(growth percentile) 125/82 (BP 1 Location: Left arm, BP Patient Position: At rest) 78 96.7 °F (35.9 °C) (Oral) 20 5' 2.5\" (1.588 m) 183 lb (83 kg) LMP SpO2 BMI OB Status Smoking Status (Approximate) 97% 32.94 kg/m2 Hysterectomy Never Smoker Vitals History BMI and BSA Data Body Mass Index Body Surface Area 32.94 kg/m 2 1.91 m 2 Preferred Pharmacy Pharmacy Name Phone [facility] [address] [facility], [facility] [address] [facility] [phone] Your Updated Medication List  
  
   
This list is accurate as of: 8/24/17 10:35 AM.  Always use your most recent med list.  
  
  
  
  
 albuterol 90 mcg/actuation inhaler Commonly known as:  PROVENTIL HFA, VENTOLIN HFA, PROAIR HFA Take 1 Puff by inhalation every six (6) hours as needed for Wheezing. fluticasone 50 mcg/actuation nasal spray Commonly known as:  FLONASE  
USE TWO SPRAYS IN EACH NOSTRIL EVERY DAY  
  
 hydrocortisone 2.5 % topical cream  
Commonly known as:  HYTONE Apply  to affected area two (2) times a day. use thin layer  
  
 loratadine 10 mg tablet Commonly known as:  Stacey Galea Take 10 mg by mouth. OTHER(NON-FORMULARY) 1 Cap daily. Greens herbals We Performed the Following LIPID PANEL [83321 CPT(R)] METABOLIC PANEL, BASIC [58648 CPT(R)] OCCULT BLOOD, IMMUNOASSAY (FIT) Y023383 CPT(R)] Follow-up Instructions Return in about 3 months (around 11/24/2017) for routine follow up. Introducing [facility] & HEALTH SERVICES! [employer] introduces [employer] patient portal. Now you can access parts of your medical record, email your doctor's office, and request medication refills online. 1. In your internet browser, go to [email or web address] 2. Click on the First Time User? Click Here link in the Sign In box. You will see the New Member Sign Up page. 3. Enter your [employer] Access Code exactly as it appears below. You will not need to use this code after youve completed the sign-up process. If you do not sign up before the expiration date, you must request a new code. · [employer] Access Code: [email or web address] Expires: 11/8/2017  9:41 AM 
 
4. Enter the last four digits of your Social Security Number (xxxx) and Date of Birth (mm/dd/yyyy) as indicated and click Submit. You will be taken to the next sign-up page. 5. Create a BLiNQ Mediat ID. This will be your [employer] login ID and cannot be changed, so think of one that is secure and easy to remember. 6. Create a [employer] password. You can change your password at any time. 7. Enter your Password Reset Question and Answer.  This can be used at a later time if you forget your password. 8. Enter your e-mail address. You will receive e-mail notification when new information is available in [address] 9. Click Sign Up. You can now view and download portions of your medical record. 10. Click the Download Summary menu link to download a portable copy of your medical information. If you have questions, please visit the Frequently Asked Questions section of the Siftit website. Remember, Sift[employer] is NOT to be used for urgent needs. For medical emergencies, dial 911. Now available from your iPhone and Android! Please provide this summary of care documentation to your next provider. Your primary care clinician is listed as Lizeth Richardson. If you have any questions after today's visit, please call [phone].

## 2017-08-30 LAB
BUN SERPL-MCNC: 13 MG/DL (ref 6–24)
BUN/CREAT SERPL: 21 (ref 9–23)
CALCIUM SERPL-MCNC: 9.3 MG/DL (ref 8.7–10.2)
CHLORIDE SERPL-SCNC: 103 MMOL/L (ref 96–106)
CHOLEST SERPL-MCNC: 148 MG/DL (ref 100–199)
CO2 SERPL-SCNC: 20 MMOL/L (ref 18–29)
CREAT SERPL-MCNC: 0.62 MG/DL (ref 0.57–1)
GLUCOSE SERPL-MCNC: 93 MG/DL (ref 65–99)
HDLC SERPL-MCNC: 57 MG/DL
HEMOCCULT STL QL IA: NORMAL
INTERPRETATION, 910389: NORMAL
LDLC SERPL CALC-MCNC: 60 MG/DL (ref 0–99)
POTASSIUM SERPL-SCNC: 4.2 MMOL/L (ref 3.5–5.2)
SODIUM SERPL-SCNC: 141 MMOL/L (ref 134–144)
TRIGL SERPL-MCNC: 155 MG/DL (ref 0–149)
VLDLC SERPL CALC-MCNC: 31 MG/DL (ref 5–40)

## 2017-09-05 ENCOUNTER — DOCUMENTATION ONLY (OUTPATIENT)
Dept: INTERNAL MEDICINE CLINIC | Age: 52
End: 2017-09-05

## 2017-09-07 LAB
HEMOCCULT STL QL IA: NEGATIVE
PLEASE NOTE:, 188601: NORMAL

## 2017-09-22 ENCOUNTER — TELEPHONE (OUTPATIENT)
Dept: INTERNAL MEDICINE CLINIC | Age: 52
End: 2017-09-22

## 2017-09-22 NOTE — TELEPHONE ENCOUNTER
Pt called wanted to speak with Dr Miguel Ko stated that she was concerned that dr Ezio eLi asked her to come back on 8/24 and charged her for that visit when he asked her to come in for the blood work and the check on her bp stated that she only advised that the medication he originally given her stopped her from urinating. the patient would like a call back from dr Ezio Lei

## 2017-09-29 ENCOUNTER — TELEPHONE (OUTPATIENT)
Dept: INTERNAL MEDICINE CLINIC | Age: 52
End: 2017-09-29

## 2017-09-29 NOTE — TELEPHONE ENCOUNTER
PT CALLED STATED THAT SHE WOULD LIKE  THE NURSE TO CALL HER BACK STATED THAT SHE WAS RETURNING HIS CALL

## 2019-02-22 ENCOUNTER — TELEPHONE (OUTPATIENT)
Dept: INTERNAL MEDICINE CLINIC | Age: 54
End: 2019-02-22

## 2019-02-22 NOTE — TELEPHONE ENCOUNTER
----- Message from Sherl Pencil sent at 2/21/2019  4:35 PM EST -----  Regarding: Vadim Atkinson MD  Pt would like to know how to get a C PAP machine. Pt would like to know if she has to go through PCP or can she be referred to a sleep specialist. Pt states that a VM can be left.   Pts contact [phone]

## 2019-07-30 ENCOUNTER — OFFICE VISIT (OUTPATIENT)
Dept: INTERNAL MEDICINE CLINIC | Age: 54
End: 2019-07-30

## 2019-07-30 VITALS
DIASTOLIC BLOOD PRESSURE: 98 MMHG | HEIGHT: 63 IN | SYSTOLIC BLOOD PRESSURE: 154 MMHG | HEART RATE: 80 BPM | WEIGHT: 190 LBS | RESPIRATION RATE: 16 BRPM | TEMPERATURE: 97.9 F | OXYGEN SATURATION: 96 % | BODY MASS INDEX: 33.66 KG/M2

## 2019-07-30 DIAGNOSIS — Z12.11 SCREEN FOR COLON CANCER: ICD-10-CM

## 2019-07-30 DIAGNOSIS — Z23 ENCOUNTER FOR IMMUNIZATION: ICD-10-CM

## 2019-07-30 DIAGNOSIS — L30.1 DYSHYDROSIS: ICD-10-CM

## 2019-07-30 DIAGNOSIS — J30.1 SEASONAL ALLERGIC RHINITIS DUE TO POLLEN: ICD-10-CM

## 2019-07-30 DIAGNOSIS — R03.0 ELEVATED BLOOD PRESSURE READING: ICD-10-CM

## 2019-07-30 DIAGNOSIS — Z13.220 SCREENING CHOLESTEROL LEVEL: ICD-10-CM

## 2019-07-30 DIAGNOSIS — Z11.3 ROUTINE SCREENING FOR STI (SEXUALLY TRANSMITTED INFECTION): ICD-10-CM

## 2019-07-30 DIAGNOSIS — Z00.00 WELL WOMAN EXAM (NO GYNECOLOGICAL EXAM): Primary | ICD-10-CM

## 2019-07-30 DIAGNOSIS — Z12.39 SCREENING FOR MALIGNANT NEOPLASM OF BREAST: ICD-10-CM

## 2019-07-30 DIAGNOSIS — Z13.1 SCREENING FOR DIABETES MELLITUS: ICD-10-CM

## 2019-07-30 RX ORDER — FLUTICASONE PROPIONATE 50 MCG
2 SPRAY, SUSPENSION (ML) NASAL DAILY
Qty: 1 BOTTLE | Refills: 5 | Status: SHIPPED | OUTPATIENT
Start: 2019-07-30 | End: 2020-08-09

## 2019-07-30 RX ORDER — BETAMETHASONE DIPROPIONATE 0.5 MG/G
CREAM TOPICAL 2 TIMES DAILY
Qty: 15 G | Refills: 1 | Status: SHIPPED | OUTPATIENT
Start: 2019-07-30 | End: 2021-08-16 | Stop reason: ALTCHOICE

## 2019-07-30 NOTE — LETTER
7/30/2019 10:56 AM 
 
 
 
Ms. Abhishek Bolanos [address] [facility] [id number] Della Sherman is a patient that is currently under my care. She suffers from plantar fasciitis and to help alleviate discomfort from this condition she needs to wear tennis shoes to provide her feet the support that they need. Please call me with any questions regarding this matter. Sincerely, Mohan Coy MD

## 2019-07-30 NOTE — PROGRESS NOTES
Chief Complaint   Patient presents with    Physical     I have reviewed the patient's medical history in detail and updated the computerized patient record. Health Maintenance reviewed. 1. Have you been to the ER, urgent care clinic since your last visit? Hospitalized since your last visit?no    2. Have you seen or consulted any other health care providers outside of the [address] [place] [address] [place] since your last visit? Include any pap smears or colon screening. No      Encouraged pt to discuss pt's wishes with spouse/partner/family and bring them in the next appt to follow thru with the Advanced Directive    Fall Risk Assessment, last 12 mths 7/30/2019   Able to walk? Yes   Fall in past 12 months? No       3 most recent PHQ Screens 7/30/2019   Little interest or pleasure in doing things Several days   Feeling down, depressed, irritable, or hopeless -   Total Score PHQ 2 -       Abuse Screening Questionnaire 7/30/2019   Do you ever feel afraid of your partner? N   Are you in a relationship with someone who physically or mentally threatens you? N   Is it safe for you to go home?  Y       ADL Assessment 7/30/2019   Feeding yourself No Help Needed   Getting from bed to chair No Help Needed   Getting dressed No Help Needed   Bathing or showering No Help Needed   Walk across the room (includes cane/walker) No Help Needed   Using the telphone No Help Needed   Taking your medications No Help Needed   Preparing meals No Help Needed   Managing money (expenses/bills) No Help Needed   Moderately strenuous housework (laundry) No Help Needed   Shopping for personal items (toiletries/medicines) No Help Needed   Shopping for groceries No Help Needed   Driving No Help Needed   Climbing a flight of stairs No Help Needed   Getting to places beyond walking distances No Help Needed

## 2019-07-30 NOTE — PROGRESS NOTES
Subjective:   52 y.o. female for Well Woman Check. Her gyne and breast care is done elsewhere by her Ob-Gyne physician. Hysterectome. Taking decongestants. Patient Active Problem List    Diagnosis Date Noted    Essential hypertension 08/10/2017    Eczema 08/19/2016    Allergic rhinitis 08/16/2016     Current Outpatient Medications   Medication Sig Dispense Refill    varicella-zoster recombinant, PF, (SHINGRIX) 50 mcg/0.5 mL susr injection 0.5 mL by IntraMUSCular route once for 1 dose. 0.5 mL 0    betamethasone dipropionate (DIPROSONE) 0.05 % topical cream Apply  to affected area two (2) times a day. 15 g 1    loratadine (CLARITIN) 10 mg tablet Take 10 mg by mouth.  albuterol (PROVENTIL HFA, VENTOLIN HFA, PROAIR HFA) 90 mcg/actuation inhaler Take 1 Puff by inhalation every six (6) hours as needed for Wheezing. 1 Inhaler 1    hydrocortisone (HYTONE) 2.5 % topical cream Apply  to affected area two (2) times a day. use thin layer 30 g 1    OTHER,NON-FORMULARY, 1 Cap daily.  Greens herbals        Allergies   Allergen Reactions    Codeine Itching    Hydrochlorothiazide Myalgia     Past Medical History:   Diagnosis Date    Essential hypertension 8/10/2017    Psychiatric disorder     anxiety past hx     Past Surgical History:   Procedure Laterality Date    HX CHOLECYSTECTOMY      HX DILATION AND CURETTAGE      x2    HX GYN      fibroid tumor removal    HX PARTIAL HYSTERECTOMY  2011    fibroids     Family History   Problem Relation Age of Onset    Diabetes Father      Social History     Tobacco Use    Smoking status: Never Smoker    Smokeless tobacco: Never Used   Substance Use Topics    Alcohol use: No     Alcohol/week: 0.0 standard drinks        Lab Results   Component Value Date/Time    WBC 7.6 01/05/2017 10:06 AM    HGB 15.0 01/05/2017 10:06 AM    HCT 44.6 01/05/2017 10:06 AM    PLATELET 118 34/86/2150 10:06 AM    MCV 90 01/05/2017 10:06 AM     Lab Results   Component Value Date/Time Cholesterol, total 173 07/30/2019 10:51 AM    HDL Cholesterol 73 07/30/2019 10:51 AM    LDL, calculated 77 07/30/2019 10:51 AM    Triglyceride 116 07/30/2019 10:51 AM     Lab Results   Component Value Date/Time    ALT (SGPT) 50 (H) 07/22/2014 08:57 AM    AST (SGOT) 28 07/22/2014 08:57 AM    Alk. phosphatase 50 07/22/2014 08:57 AM    Bilirubin, total 0.5 07/22/2014 08:57 AM    Albumin 4.4 07/22/2014 08:57 AM    Protein, total 6.6 07/22/2014 08:57 AM    PLATELET 405 18/84/0944 10:06 AM     Lab Results   Component Value Date/Time    GFR est non- 07/30/2019 10:51 AM    GFR est  07/30/2019 10:51 AM    Creatinine 0.58 07/30/2019 10:51 AM    BUN 12 07/30/2019 10:51 AM    Sodium 142 07/30/2019 10:51 AM    Potassium 4.3 07/30/2019 10:51 AM    Chloride 103 07/30/2019 10:51 AM    CO2 23 07/30/2019 10:51 AM     Lab Results   Component Value Date/Time    Glucose 98 07/30/2019 10:51 AM         ROS: Feeling generally well. No TIA's or unusual headaches, no dysphagia. No prolonged cough. No dyspnea or chest pain on exertion. No abdominal pain, change in bowel habits, black or bloody stools. No urinary tract symptoms. No new or unusual musculoskeletal symptoms. Specific concerns today: none feels well in gen. Objective: The patient appears well, alert, oriented x 3, in no distress. Visit Vitals  BP (!) 142/97   Pulse 80   Temp 97.9 °F (36.6 °C) (Oral)   Resp 16   Ht 5' 2.5\" (1.588 m)   Wt 190 lb (86.2 kg)   LMP 09/01/2011   SpO2 96%   BMI 34.20 kg/m²     ENT normal.  Neck supple. No adenopathy or thyromegaly. MANJULA. Lungs are clear, good air entry, no wheezes, rhonchi or rales. S1 and S2 normal, no murmurs, regular rate and rhythm. Abdomen soft without tenderness, guarding, mass or organomegaly. Extremities show no edema, normal peripheral pulses. Neurological is normal, no focal findings. Breast and Pelvic exams are deferred.     Assessment/Plan:   Well Woman  lose weight, follow low fat diet, routine labs ordered  Encounter Diagnoses   Name Primary?  Well woman exam (no gynecological exam) Yes    Comment: [V70.0]    Screening for malignant neoplasm of breast     Screen for colon cancer     Encounter for immunization     Dyshydrosis     Elevated blood pressure reading     Screening for diabetes mellitus     Screening cholesterol level     Routine screening for STI (sexually transmitted infection)     Seasonal allergic rhinitis due to pollen      Orders Placed This Encounter    AGUSTÍN MAMMO BI SCREENING INCL CAD    METABOLIC PANEL, BASIC    LIPID PANEL    RPR    OCCULT BLOOD IMMUNOASSAY,DIAGNOSTIC    CVD REPORT    varicella-zoster recombinant, PF, (SHINGRIX) 50 mcg/0.5 mL susr injection    betamethasone dipropionate (DIPROSONE) 0.05 % topical cream    fluticasone propionate (FLONASE) 50 mcg/actuation nasal spray     Follow-up and Dispositions    · Return in about 2 months (around 9/30/2019) for routine follow up.

## 2019-07-31 LAB
BUN SERPL-MCNC: 12 MG/DL (ref 6–24)
BUN/CREAT SERPL: 21 (ref 9–23)
CALCIUM SERPL-MCNC: 10 MG/DL (ref 8.7–10.2)
CHLORIDE SERPL-SCNC: 103 MMOL/L (ref 96–106)
CHOLEST SERPL-MCNC: 173 MG/DL (ref 100–199)
CO2 SERPL-SCNC: 23 MMOL/L (ref 20–29)
CREAT SERPL-MCNC: 0.58 MG/DL (ref 0.57–1)
GLUCOSE SERPL-MCNC: 98 MG/DL (ref 65–99)
HDLC SERPL-MCNC: 73 MG/DL
INTERPRETATION, 910389: NORMAL
LDLC SERPL CALC-MCNC: 77 MG/DL (ref 0–99)
POTASSIUM SERPL-SCNC: 4.3 MMOL/L (ref 3.5–5.2)
RPR SER QL: NON REACTIVE
SODIUM SERPL-SCNC: 142 MMOL/L (ref 134–144)
TRIGL SERPL-MCNC: 116 MG/DL (ref 0–149)
VLDLC SERPL CALC-MCNC: 23 MG/DL (ref 5–40)

## 2019-08-22 NOTE — LETTER
9/20/2017 9:27 AM 
 
Ms. Ashley Burgos [address] [facility] [address] Dear Ashley Burgos: 
 
Please find your most recent results below. Resulted Orders METABOLIC PANEL, BASIC Result Value Ref Range Glucose 93 65 - 99 mg/dL BUN 13 6 - 24 mg/dL Creatinine 0.62 0.57 - 1.00 mg/dL GFR est non- >59 mL/min/1.73 GFR est  >59 mL/min/1.73  
 BUN/Creatinine ratio 21 9 - 23 Sodium 141 134 - 144 mmol/L Potassium 4.2 3.5 - 5.2 mmol/L Chloride 103 96 - 106 mmol/L  
 CO2 20 18 - 29 mmol/L Calcium 9.3 8.7 - 10.2 mg/dL Narrative Performed at:  [address]  [id number] : Tiny Avery MD, Phone:  [phone] LIPID PANEL Result Value Ref Range Cholesterol, total 148 100 - 199 mg/dL Triglyceride 155 (H) 0 - 149 mg/dL HDL Cholesterol 57 >39 mg/dL VLDL, calculated 31 5 - 40 mg/dL LDL, calculated 60 0 - 99 mg/dL Narrative Performed at:  [address]  [id number] : Tiny Avery MD, Phone:  [phone] OCCULT BLOOD, IMMUNOASSAY (FIT) Result Value Ref Range Occult Blood fecal, by IA CANCELED Comment:  
   Result canceled by the ancillary Narrative Performed at:  [address]  [id number] : Tiny Avery MD, Phone:  [phone] CVD REPORT Result Value Ref Range INTERPRETATION Note Comment:  
   Supplement report is available. Narrative Performed at:  [address] [place] [address]  [id number] : Winsome Vera PhD, Phone:  [phone] OCCULT BLOOD, IMMUNOASSAY (FIT) Result Value Ref Range Occult Blood fecal, by IA Negative Negative Narrative Performed at:  [address]  [id number] : Germaine Quezada MD, Phone:  [phone] PLEASE NOTE Result Value Ref Range Please note Comment Comment:  
   The date and/or time of collection was not indicated on the 
requisition as required by state and federal law. The date of 
receipt of the specimen was used as the collection date if not 
supplied. Narrative Performed at:  [address]  [id number] : Germaine Quezada MD, Phone:  [phone] RECOMMENDATIONS: 
The results of your most recent stool test show normal results, no blood evident in stool. Please follow up as scheduled. Please call me if you have any questions: [phone] Sincerely, Buddy Morales MD 
 Detail Level: Detailed

## 2019-08-23 ENCOUNTER — CLINICAL SUPPORT (OUTPATIENT)
Dept: INTERNAL MEDICINE CLINIC | Age: 54
End: 2019-08-23

## 2019-08-23 VITALS
DIASTOLIC BLOOD PRESSURE: 88 MMHG | SYSTOLIC BLOOD PRESSURE: 137 MMHG | HEART RATE: 76 BPM | BODY MASS INDEX: 33.66 KG/M2 | OXYGEN SATURATION: 95 % | HEIGHT: 63 IN | WEIGHT: 190 LBS | RESPIRATION RATE: 16 BRPM | TEMPERATURE: 97.6 F

## 2019-08-23 DIAGNOSIS — I10 ESSENTIAL HYPERTENSION: Primary | ICD-10-CM

## 2019-08-23 NOTE — PROGRESS NOTES
Chief Complaint   Patient presents with    Blood Pressure Check     Physician Form filled out for patient with her labs and BP as per Dr. Edel Jimenez, original given to patient.

## 2019-10-23 ENCOUNTER — OFFICE VISIT (OUTPATIENT)
Dept: INTERNAL MEDICINE CLINIC | Age: 54
End: 2019-10-23

## 2019-10-23 VITALS
OXYGEN SATURATION: 95 % | TEMPERATURE: 98.3 F | RESPIRATION RATE: 18 BRPM | HEIGHT: 63 IN | SYSTOLIC BLOOD PRESSURE: 133 MMHG | BODY MASS INDEX: 33.88 KG/M2 | WEIGHT: 191.2 LBS | DIASTOLIC BLOOD PRESSURE: 88 MMHG | HEART RATE: 75 BPM

## 2019-10-23 DIAGNOSIS — R05.9 COUGH: Primary | ICD-10-CM

## 2019-10-23 DIAGNOSIS — J01.00 ACUTE MAXILLARY SINUSITIS, RECURRENCE NOT SPECIFIED: ICD-10-CM

## 2019-10-23 RX ORDER — AMOXICILLIN AND CLAVULANATE POTASSIUM 875; 125 MG/1; MG/1
1 TABLET, FILM COATED ORAL 2 TIMES DAILY
Qty: 14 TAB | Refills: 0 | Status: SHIPPED | OUTPATIENT
Start: 2019-10-23 | End: 2019-10-30

## 2019-10-23 RX ORDER — BENZONATATE 200 MG/1
200 CAPSULE ORAL
Qty: 21 CAP | Refills: 0 | Status: SHIPPED | OUTPATIENT
Start: 2019-10-23 | End: 2019-10-30

## 2019-10-23 NOTE — LETTER
NOTIFICATION RETURN TO WORK / SCHOOL 
 
10/23/2019 8:51 AM 
 
Ms. Ann Ware [address] [facility] [id number] To Whom It May Concern: 
 
Ann Ware is currently under the care of [address]. She will return to work/school on: 10/25/2019 If there are questions or concerns please have the patient contact our office. Sincerely, Sahil Dawkins NP

## 2019-10-23 NOTE — PATIENT INSTRUCTIONS
Sinusitis: Care Instructions  Your Care Instructions    Sinusitis is an infection of the lining of the sinus cavities in your head. Sinusitis often follows a cold. It causes pain and pressure in your head and face. In most cases, sinusitis gets better on its own in 1 to 2 weeks. But some mild symptoms may last for several weeks. Sometimes antibiotics are needed. Follow-up care is a key part of your treatment and safety. Be sure to make and go to all appointments, and call your doctor if you are having problems. It's also a good idea to know your test results and keep a list of the medicines you take. How can you care for yourself at home? · Take an over-the-counter pain medicine, such as acetaminophen (Tylenol), ibuprofen (Advil, Motrin), or naproxen (Aleve). Read and follow all instructions on the label. · If the doctor prescribed antibiotics, take them as directed. Do not stop taking them just because you feel better. You need to take the full course of antibiotics. · Be careful when taking over-the-counter cold or flu medicines and Tylenol at the same time. Many of these medicines have acetaminophen, which is Tylenol. Read the labels to make sure that you are not taking more than the recommended dose. Too much acetaminophen (Tylenol) can be harmful. · Breathe warm, moist air from a steamy shower, a hot bath, or a sink filled with hot water. Avoid cold, dry air. Using a humidifier in your home may help. Follow the directions for cleaning the machine. · Use saline (saltwater) nasal washes to help keep your nasal passages open and wash out mucus and bacteria. You can buy saline nose drops at a grocery store or drugstore. Or you can make your own at home by adding 1 teaspoon of salt and 1 teaspoon of baking soda to 2 cups of distilled water. If you make your own, fill a bulb syringe with the solution, insert the tip into your nostril, and squeeze gently. Truddie Piano your nose.   · Put a hot, wet towel or a warm gel pack on your face 3 or 4 times a day for 5 to 10 minutes each time. · Try a decongestant nasal spray like oxymetazoline (Afrin). Do not use it for more than 3 days in a row. Using it for more than 3 days can make your congestion worse. When should you call for help? Call your doctor now or seek immediate medical care if:    · You have new or worse swelling or redness in your face or around your eyes.     · You have a new or higher fever.    Watch closely for changes in your health, and be sure to contact your doctor if:    · You have new or worse facial pain.     · The mucus from your nose becomes thicker (like pus) or has new blood in it.     · You are not getting better as expected. Where can you learn more? Go to http://nely-sandie.info/. Enter N040 in the search box to learn more about \"Sinusitis: Care Instructions. \"  Current as of: October 21, 2018  Content Version: 12.2  © 3304-4298 [employer]. Care instructions adapted under license by [employer] (which disclaims liability or warranty for this information). If you have questions about a medical condition or this instruction, always ask your healthcare professional. [place] [address] any warranty or liability for your use of this information. Cough: Care Instructions  Your Care Instructions    A cough is your body's response to something that bothers your throat or airways. Many things can cause a cough. You might cough because of a cold or the flu, bronchitis, or asthma. Smoking, postnasal drip, allergies, and stomach acid that backs up into your throat also can cause coughs. A cough is a symptom, not a disease. Most coughs stop when the cause, such as a cold, goes away. You can take a few steps at home to cough less and feel better. Follow-up care is a key part of your treatment and safety.  Be sure to make and go to all appointments, and call your doctor if you are having problems. It's also a good idea to know your test results and keep a list of the medicines you take. How can you care for yourself at home? · Drink lots of water and other fluids. This helps thin the mucus and soothes a dry or sore throat. Honey or lemon juice in hot water or tea may ease a dry cough. · Take cough medicine as directed by your doctor. · Prop up your head on pillows to help you breathe and ease a dry cough. · Try cough drops to soothe a dry or sore throat. Cough drops don't stop a cough. Medicine-flavored cough drops are no better than candy-flavored drops or hard candy. · Do not smoke. Avoid secondhand smoke. If you need help quitting, talk to your doctor about stop-smoking programs and medicines. These can increase your chances of quitting for good. When should you call for help? Call 911 anytime you think you may need emergency care. For example, call if:    · You have severe trouble breathing.    Call your doctor now or seek immediate medical care if:    · You cough up blood.     · You have new or worse trouble breathing.     · You have a new or higher fever.     · You have a new rash.    Watch closely for changes in your health, and be sure to contact your doctor if:    · You cough more deeply or more often, especially if you notice more mucus or a change in the color of your mucus.     · You have new symptoms, such as a sore throat, an earache, or sinus pain.     · You do not get better as expected. Where can you learn more? Go to http://nely-sandie.info/. Enter D279 in the search box to learn more about \"Cough: Care Instructions. \"  Current as of: June 9, 2019  Content Version: 12.2  © 9811-6952 [employer]. Care instructions adapted under license by [employer] (which disclaims liability or warranty for this information).  If you have questions about a medical condition or this instruction, always ask your healthcare professional. [employer] [place], [facility] disclaims any warranty or liability for your use of this information. Instructions  Supportive Care  Increase your fluid consumption, especially water. Eat a well-balanced and avoid dairy and sugar as these can increase or thicken congestion. Also advised to use OTC nasal saline 2 sprays each nostril 2-3 times per day to assist with eustachian tube draining. Rest. Do not participate in any vigorous exercise or strenuous activity until you have been feeling better for 24 hours. Use good handwashing before and after eating. Use hand  if you are in a public place. If you need to cough or sneeze, use the crook of your arm to cover your mouth. If you are not feeling better or you begin to feel worse or develop new symptoms in 3-4 days please call. Use tessalon pearls for cough  Augmentin for sinus infection  Saline rinses    If s/s worsen go to the ER.

## 2019-10-23 NOTE — PROGRESS NOTES
1. Have you been to the ER, urgent care clinic since your last visit? Hospitalized since your last visit? No    2. Have you seen or consulted any other health care providers outside of the [address] [place] [address] since your last visit? Include any pap smears or colon screening.  No     Health Maintenance Due   Topic Date Due    FOBT Q 1 YEAR AGE 50-75  09/06/2018    BREAST CANCER SCRN MAMMOGRAM  09/08/2018    Influenza Age 9 to Adult  08/01/2019

## 2019-10-23 NOTE — PROGRESS NOTES
Subjective: (As above and below)     Chief Complaint   Patient presents with    Pressure Behind the Eyes     X 7 DAYS    Cough     X 7 DAYS       She is a 62y.o. year old female who presents for evaluation. Symptoms began as a sore throat, dry nagging cough, nasal congestion, and colored mucus x 2 weeks. Took allergy med- Claritin, Flonase, Nyquil and Dayquil with minimal relief. Last week tried meds with DM, and tried mucinex with minimal relief. Helped temporarily. Stated nasal rinses are helping, but stated she still has a cough and sore throat. Works at a [employer]. Reviewed PmHx, RxHx, FmHx, SocHx, AllgHx and updated in chart. Review of Systems - Negative except as listed in HPI. Positive for hot flashes. Denies fever, chills, cp, sob, nausea, vomitting, abdominal pain. Objective:     Visit Vitals  /88 (BP 1 Location: Right arm, BP Patient Position: Sitting)   Pulse 75   Temp 98.3 °F (36.8 °C) (Oral)   Resp 18   Ht 5' 2.5\" (1.588 m)   Wt 191 lb 3.2 oz (86.7 kg)   LMP 09/01/2011   SpO2 95%   BMI 34.41 kg/m²     Physical Examination: General appearance - oriented to person, place, and time and in no distress. Sleepy at times, appears to not feel well. Eyes - pupils equal and reactive, extraocular eye movements intact  Ears - bilateral TM's and external ear canals normal  Nose - normal and patent, no erythema, discharge or polyps, frontal and maxillary tenderness  Mouth - erythematous, dental hygiene good and tongue normal  Neck - bilateral symmetric anterior adenopathy  Lymphatics - few small anterior cervical nodes  Chest - clear to auscultation, no wheezes, rales or rhonchi, symmetric air entrycoarse  Heart - normal rate, regular rhythm, normal S1, S2, no murmurs, rubs, clicks or gallops, normal rate and regular rhythm    Assessment/ Plan:       ICD-10-CM ICD-9-CM    1. Cough R05 786.2 benzonatate (TESSALON) 200 mg capsule   2.  Acute maxillary sinusitis, recurrence not specified J01.00 461.0 amoxicillin-clavulanate (AUGMENTIN) 875-125 mg per tablet     1. Cough  Take as directed for cough. - benzonatate (TESSALON) 200 mg capsule; Take 1 Cap by mouth three (3) times daily as needed for Cough for up to 7 days. Dispense: 21 Cap; Refill: 0    2. Acute maxillary sinusitis, recurrence not specified  Since symptoms have been going on for 2 weeks, ordered antibiotic. Take with food as directed. - amoxicillin-clavulanate (AUGMENTIN) 875-125 mg per tablet; Take 1 Tab by mouth two (2) times a day for 7 days. Dispense: 14 Tab; Refill: 0     Instructions  Supportive Care  Increase your fluid consumption, especially water. Eat a well-balanced and avoid dairy and sugar as these can increase or thicken congestion. Also advised to use OTC nasal saline 2 sprays each nostril 2-3 times per day to assist with eustachian tube draining. Rest. Do not participate in any vigorous exercise or strenuous activity until you have been feeling better for 24 hours. Use good handwashing before and after eating. Use hand  if you are in a public place. If you need to cough or sneeze, use the crook of your arm to cover your mouth. If you are not feeling better or you begin to feel worse or develop new symptoms in 3-4 days please call. Use tessalon pearls for cough  Augmentin for sinus infection  Saline rinses    I have discussed the diagnosis with the patient and the intended plan as seen in the above orders. The patient has received an after-visit summary and questions were answered concerning future plans. Medication Side Effects and Warnings were discussed with patient: yes  Patient Labs were reviewed: yes  Patient Past Records were reviewed:  Yes    If s/s worsen, go to the ER. Follow-up and Dispositions    · Return in about 3 months (around 1/23/2020), or if symptoms worsen or fail to improve, for follow up.        Reanna Estrella NP

## 2019-11-08 ENCOUNTER — TELEPHONE (OUTPATIENT)
Dept: INTERNAL MEDICINE CLINIC | Age: 54
End: 2019-11-08

## 2019-11-08 ENCOUNTER — OFFICE VISIT (OUTPATIENT)
Dept: INTERNAL MEDICINE CLINIC | Age: 54
End: 2019-11-08

## 2019-11-08 VITALS
TEMPERATURE: 98.3 F | HEART RATE: 83 BPM | SYSTOLIC BLOOD PRESSURE: 131 MMHG | OXYGEN SATURATION: 96 % | DIASTOLIC BLOOD PRESSURE: 85 MMHG | HEIGHT: 63 IN | WEIGHT: 185 LBS | BODY MASS INDEX: 32.78 KG/M2 | RESPIRATION RATE: 16 BRPM

## 2019-11-08 DIAGNOSIS — I10 ESSENTIAL HYPERTENSION: ICD-10-CM

## 2019-11-08 DIAGNOSIS — R53.83 FATIGUE, UNSPECIFIED TYPE: Primary | ICD-10-CM

## 2019-11-08 DIAGNOSIS — J30.1 SEASONAL ALLERGIC RHINITIS DUE TO POLLEN: ICD-10-CM

## 2019-11-08 DIAGNOSIS — G47.30 SLEEP APNEA, UNSPECIFIED TYPE: Primary | ICD-10-CM

## 2019-11-08 DIAGNOSIS — F32.1 EPISODE OF MODERATE MAJOR DEPRESSION (HCC): ICD-10-CM

## 2019-11-08 RX ORDER — SERTRALINE HYDROCHLORIDE 25 MG/1
25 TABLET, FILM COATED ORAL DAILY
Qty: 30 TAB | Refills: 2 | Status: SHIPPED | OUTPATIENT
Start: 2019-11-08 | End: 2020-02-14 | Stop reason: SDUPTHER

## 2019-11-08 RX ORDER — LORATADINE 10 MG/1
20 TABLET ORAL
Qty: 60 TAB | Refills: 5 | Status: SHIPPED | OUTPATIENT
Start: 2019-11-08 | End: 2020-10-01 | Stop reason: SDUPTHER

## 2019-11-08 NOTE — PATIENT INSTRUCTIONS

## 2019-11-08 NOTE — PROGRESS NOTES
HISTORY OF PRESENT ILLNESS  Safia Arroyo is a 62 y.o. female. Overheats easily. Has never been tested for SA. 12 to 2 AM goes to sleep, wakes 6:45-7AM. Min c/o fatigue. Gets panic attacks which wake her from sleep if goes to bed earlier. At work being written up [employer] stress. Paxil yrs ago. Sleep issues causing a great deal of problems that her work. At one time she was given a yearly best employee award to the point where they stopped it. They currently talking about letting her go. She is being written out frequently and is having issues with her boss. Sleep Problem   The history is provided by the patient (supervisor wants her to be eval). This is a new problem. Episode onset: 6 mo or more. The problem occurs every several days (according to supervison). The problem has been gradually worsening. Pertinent negatives include no chest pain and no shortness of breath. Associated symptoms comments: Falls asleep. Exacerbated by: warmth in the lobby. Patient Active Problem List   Diagnosis Code    Allergic rhinitis J30.9    Eczema L30.9    Essential hypertension I10       Review of Systems   Respiratory: Negative for shortness of breath. Cardiovascular: Negative for chest pain. Physical Exam  Visit Vitals  /85   Pulse 83   Temp 98.3 °F (36.8 °C) (Oral)   Resp 16   Ht 5' 2.5\" (1.588 m)   Wt 185 lb (83.9 kg)   LMP 09/01/2011   SpO2 96%   BMI 33.30 kg/m²       WDWN NAD  TM clear, throat wnl  Neck no adenopathy  Heart RRR no C/M/R  Lungs CTA  Abdo soft non tender  Ext No redness swelling or edema    ASSESSMENT and PLAN  Encounter Diagnoses   Name Primary?     Sleep apnea, unspecified type Yes    Episode of moderate major depression (HCC)     Essential hypertension     Seasonal allergic rhinitis due to pollen      Orders Placed This Encounter    REFERRAL TO SLEEP STUDIES    sertraline (ZOLOFT) 25 mg tablet    loratadine (CLARITIN) 10 mg tablet     Panic attacks causing sleep problems? Send for sleep studies. Trial of Zoloft to see if that may help. Does not appear to be getting enough sleep. Discussed possible side affects, precautions, and drug interactions and possible benefits of the medication(s). Needs to be going to bed earlier. Needs to increase her hours of sleep. See patient instructions, went over them personally with the patient. Emphasized compliance. Questions answered. Patient states that they understand the plan of action and will call if there are any issues or misunderstandings. Follow-up and Dispositions    · Return in about 4 weeks (around 12/6/2019) for routine follow up.

## 2019-11-08 NOTE — LETTER
11/8/2019 3:23 PM 
 
Ms. Claudio Chamberlain [address] [facility] [address] Dear Ms. Kevin Garcia you will find lab orders you requested. Sincerely, Dusty Mansfield MD

## 2019-11-08 NOTE — PROGRESS NOTES
Chief Complaint   Patient presents with    Insomnia     pt falling asleep at work     I have reviewed the patient's medical history in detail and updated the computerized patient record. Health Maintenance reviewed. 1. Have you been to the ER, urgent care clinic since your last visit? Hospitalized since your last visit?no    2. Have you seen or consulted any other health care providers outside of the [address] [place] [address] [place] since your last visit? Include any pap smears or colon screening. No      Encouraged pt to discuss pt's wishes with spouse/partner/family and bring them in the next appt to follow thru with the Advanced Directive    Fall Risk Assessment, last 12 mths 7/30/2019   Able to walk? Yes   Fall in past 12 months? No       3 most recent PHQ Screens 11/8/2019   Little interest or pleasure in doing things Several days   Feeling down, depressed, irritable, or hopeless Several days   Total Score PHQ 2 2       Abuse Screening Questionnaire 7/30/2019   Do you ever feel afraid of your partner? N   Are you in a relationship with someone who physically or mentally threatens you? N   Is it safe for you to go home?  Y       ADL Assessment 7/30/2019   Feeding yourself No Help Needed   Getting from bed to chair No Help Needed   Getting dressed No Help Needed   Bathing or showering No Help Needed   Walk across the room (includes cane/walker) No Help Needed   Using the telphone No Help Needed   Taking your medications No Help Needed   Preparing meals No Help Needed   Managing money (expenses/bills) No Help Needed   Moderately strenuous housework (laundry) No Help Needed   Shopping for personal items (toiletries/medicines) No Help Needed   Shopping for groceries No Help Needed   Driving No Help Needed   Climbing a flight of stairs No Help Needed   Getting to places beyond walking distances No Help Needed

## 2019-11-08 NOTE — TELEPHONE ENCOUNTER
Pt called in reference to her labwork Dr. Eliud Avitia wanted done. She said he was suppose to be sending an order to [facility] to check her thyroid. She would also like lyme disease labs added because she goes camping every month. Please call her at [phone] when this is done.

## 2019-11-08 NOTE — LETTER
NOTIFICATION OF RETURN TO WORK 
 
11/8/2019 Ms. Maritza eBar [address] [facility] [address] Mary Ask To Whom It May Concern: 
 
Maritza Bear was under the care of [address]. She will be able to return to work on November 8, 2019. If there are questions or concerns please have the patient contact our office. Sincerely, Sammy Washington MD

## 2019-11-13 ENCOUNTER — OFFICE VISIT (OUTPATIENT)
Dept: SLEEP MEDICINE | Age: 54
End: 2019-11-13

## 2019-11-13 VITALS
OXYGEN SATURATION: 98 % | HEIGHT: 63 IN | SYSTOLIC BLOOD PRESSURE: 141 MMHG | BODY MASS INDEX: 33.49 KG/M2 | WEIGHT: 189 LBS | HEART RATE: 85 BPM | DIASTOLIC BLOOD PRESSURE: 84 MMHG

## 2019-11-13 DIAGNOSIS — G47.33 OSA (OBSTRUCTIVE SLEEP APNEA): Primary | ICD-10-CM

## 2019-11-13 DIAGNOSIS — I10 ESSENTIAL HYPERTENSION: ICD-10-CM

## 2019-11-13 DIAGNOSIS — Z86.59 H/O ANXIETY DISORDER: ICD-10-CM

## 2019-11-13 NOTE — PROGRESS NOTES
[address] [place]. [place], [address]  Tel.  [phone]  Fax. [address] [facility] [address]  [place], [address] [facility]  Tel.  [phone]  Fax. [phone] [address]   Tel.  [phone]  Fax. [phone]         Subjective:      Emiliano Keys is an 62 y.o. female referred for evaluation for a sleep disorder. She complains of snoring associated with periods of not breathing, awakening in the middle of the night because of urination and nightmares. Symptoms began several years ago, unchanged since that time. She usually can fall asleep in 5 minutes. Family or house members note snoring, periods of not breathing. She denies completely or partially paralyzed while falling asleep or waking up. Emiliano Keys does not wake up frequently at night. She is not bothered by waking up too early and left unable to get back to sleep. She actually sleeps about 5 hours at night and wakes up about 3 times during the night. She does not work shifts:  .   Lydia Alvarez indicates she does get too little sleep at night. Her bedtime is 0200. She awakens at 5. She does not take naps. She has the following observed behaviors: Loud snoring, Light snoring, Pauses in breathing; Nightmares. Other remarks: vivid dreams    [place] Sleepiness Score: 7     Allergies   Allergen Reactions    Codeine Itching    Hydrochlorothiazide Myalgia         Current Outpatient Medications:     sertraline (ZOLOFT) 25 mg tablet, Take 1 Tab by mouth daily. , Disp: 30 Tab, Rfl: 2    loratadine (CLARITIN) 10 mg tablet, Take 2 Tabs by mouth nightly., Disp: 60 Tab, Rfl: 5    OTHER,NON-FORMULARY,, 1 Cap daily. Greens herbals , Disp: , Rfl:     betamethasone dipropionate (DIPROSONE) 0.05 % topical cream, Apply  to affected area two (2) times a day., Disp: 15 g, Rfl: 1    fluticasone propionate (FLONASE) 50 mcg/actuation nasal spray, 2 Sprays by Both Nostrils route daily. , Disp: 1 Bottle, Rfl: 5    albuterol (PROVENTIL HFA, VENTOLIN HFA, PROAIR HFA) 90 mcg/actuation inhaler, Take 1 Puff by inhalation every six (6) hours as needed for Wheezing., Disp: 1 Inhaler, Rfl: 1    hydrocortisone (HYTONE) 2.5 % topical cream, Apply  to affected area two (2) times a day. use thin layer, Disp: 30 g, Rfl: 1     She  has a past medical history of Essential hypertension (8/10/2017) and Psychiatric disorder. She  has a past surgical history that includes hx cholecystectomy; hx partial hysterectomy (2011); hx gyn; and hx dilation and curettage. She family history includes Diabetes in her father. She  reports that she has never smoked. She has never used smokeless tobacco. She reports that she does not drink alcohol or use drugs. Review of Systems:  Constitutional:  No significant weight loss or weight gain  Eyes:  No blurred vision  CVS:  No significant chest pain  Pulm:  No significant shortness of breath  GI:  No significant nausea or vomiting  :  + significant nocturia  Musculoskeletal:  No significant joint pain at night  Skin:  No significant rashes  Neuro:  No significant dizziness   Psych:  No active mood issues    Sleep Review of Systems: notable for no difficulty falling asleep; frequent awakenings at night;  regular dreaming noted; nightmares ; early morning headaches; no memory problems; no concentration issues; no history of any automobile or occupational accidents due to daytime drowsiness.       Objective:     Visit Vitals  /84 (BP 1 Location: Right arm, BP Patient Position: Sitting)   Pulse 85   Ht 5' 2.5\" (1.588 m)   Wt 189 lb (85.7 kg)   LMP 09/01/2011   SpO2 98%   BMI 34.02 kg/m²         General:   Not in acute distress   Eyes:  Anicteric sclerae, no obvious strabismus   Nose:  No obvious nasal septum deviation    Oropharynx:   Class 4 oropharyngeal outlet, thick tongue base, uvula could not be seen due to low-lying soft palate, narrow tonsilo-pharyngeal pilars   Tonsils:   tonsils are not seen due to low-lying soft palate   Neck:   Neck circ. in \"inches\": 17; midline trachea   Chest/Lungs:  Equal lung expansion, clear on auscultation    CVS:  Normal rate, regular rhythm; no JVD   Skin:  Warm to touch; no obvious rashes   Neuro:  No focal deficits ; no obvious tremor    Psych:  Normal affect,  normal countenance;          Assessment:       ICD-10-CM ICD-9-CM    1. MAYI (obstructive sleep apnea) G47.33 327.23 SLEEP STUDY UNATTENDED, 4 CHANNEL   2. Essential hypertension I10 401.9    3. H/O anxiety disorder Z86.59 V11.8    4. BMI 34.0-34.9,adult Z68.34 V85.34          Plan:     * The patient currently has a Moderate Risk for having sleep apnea. STOP-BANG score 5.  * Sleep testing was ordered for initial evaluation. * She was provided information on sleep apnea including coresponding risk factors and the importance of proper treatment. * Treatment options if indicated were reviewed today. Patient agrees to a trial of PAP therapy if indicated. * Counseling was provided regarding proper sleep hygiene (including effect of light on sleep), paradoxical intention, stimulus control, sleep environment safety and safe driving. * Effect of sleep disturbance on weight was reviewed. We have recommended a dedicated weight loss through appropriate diet and an exercise regiment as significant weight reduction has been shown to reduce severity of obstructive sleep apnea. * Patient agrees to telephone [phone]  follow-up by myself or lead sleep technologist shortly after sleep study to review results and plan final management.     (patient has given permission for a message to be left regarding test results and further management if patient cannot be cannot be reached directly). Thank you for allowing us to participate in your patient's medical care. We'll keep you updated on these investigations. Teresa He MD, FAASM  Electronically signed.  11/13/19

## 2019-11-13 NOTE — PATIENT INSTRUCTIONS
[address] [facility] [place], [address]  Tel.  [phone]  Fax. [address]  [place], [address] [facility]  Tel.  [phone]  Fax. [phone] [address]  Tel.  [phone]  Fax. [phone]     Sleep Apnea: After Your Visit  Your Care Instructions  Sleep apnea occurs when you frequently stop breathing for 10 seconds or longer during sleep. It can be mild to severe, based on the number of times per hour that you stop breathing or have slowed breathing. Blocked or narrowed airways in your nose, mouth, or throat can cause sleep apnea. Your airway can become blocked when your throat muscles and tongue relax during sleep. Sleep apnea is common, occurring in 1 out of 20 individuals. Individuals having any of the following characteristics should be evaluated and treated right away due to high risk and detrimental consequences from untreated sleep apnea:  1. Obesity  2. Congestive Heart failure  3. Atrial Fibrillation  4. Uncontrolled Hypertension  5. Type II Diabetes  6. Night-time Arrhythmias  7. Stroke  8. Pulmonary Hypertension  9. High-risk Driving Populations (pilots, truck drivers, etc.)  10. Patients Considering Weight-loss Surgery    How do you know you have sleep apnea? You probably have sleep apnea if you answer 'yes' to 3 or more of the following questions:  S - Have you been told that you Snore? T - Are you often Tired during the day? O - Has anyone Observed you stop breathing while sleeping? P- Do you have (or are being treated for) high blood Pressure? B - Are you obese (Body Mass Index > 35)? A - Is your Age 48years old or older? N - Is your Neck size greater than 16 inches? G - Are you male Gender? A sleep physician can prescribe a breathing device that prevents tissues in the throat from blocking your airway.  Or your doctor may recommend using a dental device (oral breathing device) to help keep your airway open. In some cases, surgery may be needed to remove enlarged tissues in the throat. Follow-up care is a key part of your treatment and safety. Be sure to make and go to all appointments, and call your doctor if you are having problems. It's also a good idea to know your test results and keep a list of the medicines you take. How can you care for yourself at home? · Lose weight, if needed. It may reduce the number of times you stop breathing or have slowed breathing. · Go to bed at the same time every night. · Sleep on your side. It may stop mild apnea. If you tend to roll onto your back, sew a pocket in the back of your pajama top. Put a tennis ball into the pocket, and stitch the pocket shut. This will help keep you from sleeping on your back. · Avoid alcohol and medicines such as sleeping pills and sedatives before bed. · Do not smoke. Smoking can make sleep apnea worse. If you need help quitting, talk to your doctor about stop-smoking programs and medicines. These can increase your chances of quitting for good. · Prop up the head of your bed 4 to 6 inches by putting bricks under the legs of the bed. · Treat breathing problems, such as a stuffy nose, caused by a cold or allergies. · Use a continuous positive airway pressure (CPAP) breathing machine if lifestyle changes do not help your apnea and your doctor recommends it. The machine keeps your airway from closing when you sleep. · If CPAP does not help you, ask your doctor whether you should try other breathing machines. A bilevel positive airway pressure machine has two types of air pressureâone for breathing in and one for breathing out. Another device raises or lowers air pressure as needed while you breathe. · If your nose feels dry or bleeds when using one of these machines, talk with your doctor about increasing moisture in the air. A humidifier may help.   · If your nose is runny or stuffy from using a breathing machine, talk with your doctor about using decongestants or a corticosteroid nasal spray. When should you call for help? Watch closely for changes in your health, and be sure to contact your doctor if:  · You still have sleep apnea even though you have made lifestyle changes. · You are thinking of trying a device such as CPAP. · You are having problems using a CPAP or similar machine. Where can you learn more? Go to HipSnip. Enter W088 in the search box to learn more about \"Sleep Apnea: After Your Visit. \"   © 5110-6995 Healthwise, Incorporated. Care instructions adapted under license by [employer] (which disclaims liability or warranty for this information). This care instruction is for use with your licensed healthcare professional. If you have questions about a medical condition or this instruction, always ask your healthcare professional. [employer] any warranty or liability for your use of this information. PROPER SLEEP HYGIENE    What to avoid  · Do not have drinks with caffeine, such as coffee or black tea, for 8 hours before bed. · Do not smoke or use other types of tobacco near bedtime. Nicotine is a stimulant and can keep you awake. · Avoid drinking alcohol late in the evening, because it can cause you to wake in the middle of the night. · Do not eat a big meal close to bedtime. If you are hungry, eat a light snack. · Do not drink a lot of water close to bedtime, because the need to urinate may wake you up during the night. · Do not read or watch TV in bed. Use the bed only for sleeping and sexual activity. What to try  · Go to bed at the same time every night, and wake up at the same time every morning. Do not take naps during the day. · Keep your bedroom quiet, dark, and cool. · Get regular exercise, but not within 3 to 4 hours of your bedtime. .  · Sleep on a comfortable pillow and mattress.   · If watching the clock makes you anxious, turn it facing away from you so you cannot see the time. · If you worry when you lie down, start a worry book. Well before bedtime, write down your worries, and then set the book and your concerns aside. · Try meditation or other relaxation techniques before you go to bed. · If you cannot fall asleep, get up and go to another room until you feel sleepy. Do something relaxing. Repeat your bedtime routine before you go to bed again. · Make your house quiet and calm about an hour before bedtime. Turn down the lights, turn off the TV, log off the computer, and turn down the volume on music. This can help you relax after a busy day. Drowsy Driving  The [address] [place] [address] Road Traffic Safety Administration cites drowsiness as a causing factor in more than 836,372 police reported crashes annually, resulting in 76,000 injuries and 1,500 deaths. Other surveys suggest 55% of people polled have driven while drowsy in the past year, 23% had fallen asleep but not crashed, 3% crashed, and 2% had and accident due to drowsy driving. Who is at risk? Young Drivers: One study of drowsy driving accidents states that 55% of the drivers were under 25 years. Of those, 75% were male. Shift Workers and Travelers: People who work overnight or travel across time zones frequently are at higher risk of experiencing Circadian Rhythm Disorders. They are trying to work and function when their body is programed to sleep. Sleep Deprived: Lack of sleep has a serious impact on your ability to pay attention or focus on a task. Consistently getting less than the average of 8 hours your body needs creates partial or cumulative sleep deprivation. Untreated Sleep Disorders: Sleep Apnea, Narcolepsy, R.L.S., and other sleep disorders (untreated) prevent a person from getting enough restful sleep. This leads to excessive daytime sleepiness and increases the risk for drowsy driving accidents by up to 7 times.   Medications / Alcohol: Even over the counter medications can cause drowsiness. Medications that impair a drivers attention should have a warning label. Alcohol naturally makes you sleepy and on its own can cause accidents. Combined with excessive drowsiness its effects are amplified. Signs of Drowsy Driving:   * You don't remember driving the last few miles   * You may drift out of your marija   * You are unable to focus and your thoughts wander   * You may yawn more often than normal   * You have difficulty keeping your eyes open / nodding off   * Missing traffic signs, speeding, or tailgating  Prevention-   Good sleep hygiene, lifestyle and behavioral choices have the most impact on drowsy driving. There is no substitute for sleep and the average person requires 8 hours nightly. If you find yourself driving drowsy, stop and sleep. Consider the sleep hygiene tips provided during your visit as well. Medication Refill Policy: Refills for all medications require 1 week advance notice. Please have your pharmacy fax a refill request. We are unable to fax, or call in \"controled substance\" medications and you will need to pick these prescriptions up from our office. [employer] Activation    Thank you for requesting access to [employer]. Please follow the instructions below to securely access and download your online medical record. [employer] allows you to send messages to your doctor, view your test results, renew your prescriptions, schedule appointments, and more. How Do I Sign Up? 1. In your internet browser, go to [email or web address]. 2. Click on the First Time User? Click Here link in the Sign In box. You will see the New Member Sign Up page. 3. Enter your [employer] Access Code exactly as it appears below. You will not need to use this code after youve completed the sign-up process. If you do not sign up before the expiration date, you must request a new code.     [employer] Access Code: [email or web address]  Expires: 12/7/2019  7:03 AM (This is the date your nChannel access code will )    4. Enter the last four digits of your Social Security Number (xxxx) and Date of Birth (mm/dd/yyyy) as indicated and click Submit. You will be taken to the next sign-up page. 5. Create a [employer] ID. This will be your nChannel login ID and cannot be changed, so think of one that is secure and easy to remember. 6. Create a nChannel password. You can change your password at any time. 7. Enter your Password Reset Question and Answer. This can be used at a later time if you forget your password. 8. Enter your e-mail address. You will receive e-mail notification when new information is available in [address] 9. Click Sign Up. You can now view and download portions of your medical record. 10. Click the Download Summary menu link to download a portable copy of your medical information. Additional Information    If you have questions, please call [phone]. Remember, nChannel is NOT to be used for urgent needs. For medical emergencies, dial 911.

## 2019-11-19 ENCOUNTER — TELEPHONE (OUTPATIENT)
Dept: SLEEP MEDICINE | Age: 54
End: 2019-11-19

## 2019-11-19 DIAGNOSIS — G47.33 OSA (OBSTRUCTIVE SLEEP APNEA): Primary | ICD-10-CM

## 2019-11-19 NOTE — TELEPHONE ENCOUNTER
Janice Gamboa is to be contacted by lead sleep technologist regarding results of Sleep Testing which was indicative of an average AHI of 53 per hour with an SpO2 seth of 62% and SpO2 of < 88% being 114 minutes. An APAP prescription has been written and patient will be contacted by office staff regarding follow-up  in 2-3 months after initiation of therapy. Encounter Diagnosis   Name Primary?  MAYI (obstructive sleep apnea) Yes       Orders Placed This Encounter    AMB SUPPLY ORDER     Diagnosis: (G47.33) MAYI (obstructive sleep apnea)  (primary encounter diagnosis)     Respironics DreamStation ( Unit - Auto Mode) / Heated Humidifier :    Positive Airway Pressure Therapy: Duration of need: 99 months. Auto - PAP: 4 - 20 cmH2O; Optistart enabled. Ramp Time: 30 Minutes; Flex: 2. Remote monitoring enrollment.  Nasal Cushion (Replace) 2 per month.  Nasal Interface Mask 1 every 3 months.  Headgear 1 every 6 months.  Filter(s) Disposable 2 per month.  Filter(s) Non-Disposable 1 every 6 months. [address] [facility] for Locked Milo (Replace) 1 every 6 months.  Tubing with heating element 1 every 3 months. Perform Mask Fitting per patient preference and comfort - replace as above. Gloria Roca MD, FAASM; NPI: [id number]  Electronically signed. 11/19/19

## 2019-11-27 LAB
B BURGDOR IGG+IGM SER-ACNC: <0.91 ISR (ref 0–0.9)
ERYTHROCYTE [DISTWIDTH] IN BLOOD BY AUTOMATED COUNT: 12.3 % (ref 12.3–15.4)
HCT VFR BLD AUTO: 46.3 % (ref 34–46.6)
HGB BLD-MCNC: 15.8 G/DL (ref 11.1–15.9)
MCH RBC QN AUTO: 30.1 PG (ref 26.6–33)
MCHC RBC AUTO-ENTMCNC: 34.1 G/DL (ref 31.5–35.7)
MCV RBC AUTO: 88 FL (ref 79–97)
PLATELET # BLD AUTO: 259 X10E3/UL (ref 150–450)
RBC # BLD AUTO: 5.25 X10E6/UL (ref 3.77–5.28)
TSH SERPL DL<=0.005 MIU/L-ACNC: 2.64 UIU/ML (ref 0.45–4.5)
WBC # BLD AUTO: 7.4 X10E3/UL (ref 3.4–10.8)

## 2019-12-05 ENCOUNTER — TELEPHONE (OUTPATIENT)
Dept: INTERNAL MEDICINE CLINIC | Age: 54
End: 2019-12-05

## 2019-12-05 NOTE — TELEPHONE ENCOUNTER
----- Message from Donald Haji sent at 12/5/2019 12:41 PM EST -----  Regarding: Dr. Ivet Lagos telephone  General Message/Vendor Calls    Caller's first and last name:      Reason for call: Pt requesting lab results      Callback required yes/no and why: yes       Best contact number(s): [phone]      Details to clarify the request: please call after [facility]. [facility] [address]

## 2020-01-08 LAB — HEMOCCULT STL QL IA: NEGATIVE

## 2020-01-29 ENCOUNTER — DOCUMENTATION ONLY (OUTPATIENT)
Dept: SLEEP MEDICINE | Age: 55
End: 2020-01-29

## 2020-02-14 DIAGNOSIS — F32.1 EPISODE OF MODERATE MAJOR DEPRESSION (HCC): ICD-10-CM

## 2020-02-14 RX ORDER — SERTRALINE HYDROCHLORIDE 25 MG/1
25 TABLET, FILM COATED ORAL DAILY
Qty: 30 TAB | Refills: 5 | Status: SHIPPED | OUTPATIENT
Start: 2020-02-14 | End: 2020-10-01 | Stop reason: SDUPTHER

## 2020-02-14 NOTE — TELEPHONE ENCOUNTER
Requested Prescriptions     Pending Prescriptions Disp Refills    sertraline (ZOLOFT) 25 mg tablet 30 Tab 2     Sig: Take 1 Tab by mouth daily. Last office visit  11/8/19 future ? ?   Last filled  11/8/19  Changes made to medication on last visit

## 2020-02-14 NOTE — TELEPHONE ENCOUNTER
Requested Prescriptions     Pending Prescriptions Disp Refills    sertraline (ZOLOFT) 25 mg tablet 30 Tab 2     Sig: Take 1 Tab by mouth daily.

## 2020-04-22 ENCOUNTER — VIRTUAL VISIT (OUTPATIENT)
Dept: INTERNAL MEDICINE CLINIC | Age: 55
End: 2020-04-22

## 2020-04-22 DIAGNOSIS — R07.89 PAIN, CHEST WALL: Primary | ICD-10-CM

## 2020-04-22 DIAGNOSIS — F32.1 EPISODE OF MODERATE MAJOR DEPRESSION (HCC): ICD-10-CM

## 2020-04-22 DIAGNOSIS — I10 ESSENTIAL HYPERTENSION: ICD-10-CM
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
## 2020-04-23 ENCOUNTER — TELEPHONE (OUTPATIENT)
Dept: INTERNAL MEDICINE CLINIC | Age: 55
End: 2020-04-23

## 2020-04-23 DIAGNOSIS — S89.90XS KNEE INJURY, UNSPECIFIED LATERALITY, SEQUELA: ICD-10-CM

## 2020-04-23 DIAGNOSIS — S69.90XS: ICD-10-CM
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
## 2020-04-23 NOTE — TELEPHONE ENCOUNTER
Returned call and pt would like XR for L/knee (since swelling down it hurts to go upstairs) and L/R wrist because swelling and bruising has done down and starting to hurt more.
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
## 2020-08-13 ENCOUNTER — OFFICE VISIT (OUTPATIENT)
Dept: INTERNAL MEDICINE CLINIC | Age: 55
End: 2020-08-13
Payer: COMMERCIAL

## 2020-08-13 VITALS
HEIGHT: 63 IN | SYSTOLIC BLOOD PRESSURE: 136 MMHG | RESPIRATION RATE: 16 BRPM | BODY MASS INDEX: 33.13 KG/M2 | TEMPERATURE: 98.5 F | DIASTOLIC BLOOD PRESSURE: 89 MMHG | HEART RATE: 85 BPM | WEIGHT: 187 LBS | OXYGEN SATURATION: 96 %

## 2020-08-13 DIAGNOSIS — I10 ESSENTIAL HYPERTENSION: ICD-10-CM

## 2020-08-13 DIAGNOSIS — L03.012 CELLULITIS OF FINGER OF LEFT HAND: Primary | ICD-10-CM

## 2020-08-13 PROCEDURE — 99213 OFFICE O/P EST LOW 20 MIN: CPT | Performed by: FAMILY MEDICINE

## 2020-08-13 RX ORDER — CEPHALEXIN 500 MG/1
500 CAPSULE ORAL 3 TIMES DAILY
Qty: 21 CAP | Refills: 0 | Status: SHIPPED | OUTPATIENT
Start: 2020-08-13 | End: 2020-08-20

## 2020-08-13 NOTE — PROGRESS NOTES
HISTORY OF PRESENT ILLNESS  Idris Frazier is a 62 y.o. female. Insect Bite   The history is provided by the patient. This is a new problem. The current episode started 2 days ago. The problem occurs constantly. The problem has been gradually worsening. Associated symptoms comments: Left hand swelling since bitten. Exacerbated by: tobacco place on it? ROS  Mildly painful  Physical Exam  Please bring your medications with you to the next visit. You can check your  medications against what is listed in your check out sheet  Let us know if there is a difference. Left hand dorsal swelling where bitten 4th middle part of finger NV intact  ASSESSMENT and PLAN  Encounter Diagnoses   Name Primary?  Cellulitis of finger of left hand Yes    Essential hypertension      Orders Placed This Encounter    cephALEXin (KEFLEX) 500 mg capsule     Continue icing hand.   F/up prn
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
## 2020-10-01 DIAGNOSIS — J30.1 SEASONAL ALLERGIC RHINITIS DUE TO POLLEN: ICD-10-CM

## 2020-10-01 DIAGNOSIS — F32.1 EPISODE OF MODERATE MAJOR DEPRESSION (HCC): ICD-10-CM

## 2020-10-02 RX ORDER — LORATADINE 10 MG/1
20 TABLET ORAL
Qty: 60 TAB | Refills: 5 | Status: SHIPPED | OUTPATIENT
Start: 2020-10-02 | End: 2022-04-08

## 2020-10-02 RX ORDER — SERTRALINE HYDROCHLORIDE 25 MG/1
25 TABLET, FILM COATED ORAL DAILY
Qty: 30 TAB | Refills: 5 | Status: SHIPPED | OUTPATIENT
Start: 2020-10-02 | End: 2021-03-31 | Stop reason: SDUPTHER

## 2020-10-28 NOTE — TELEPHONE ENCOUNTER
Requested Prescriptions     Pending Prescriptions Disp Refills    sertraline (ZOLOFT) 25 mg tablet 30 Tab 5     Sig: Take 1 Tab by mouth daily.  loratadine (CLARITIN) 10 mg tablet 60 Tab 5     Sig: Take 2 Tabs by mouth nightly. Pt needs loratadine done for 90 days for ins purposes.
What Is The Reason For Today's Visit?: Surveillance against skin cancer recurrences
How Many Skin Cancers Have You Had?: one
When Was Your Last Cancer Diagnosed?: 2019

## 2020-12-29 ENCOUNTER — OFFICE VISIT (OUTPATIENT)
Dept: PRIMARY CARE CLINIC | Age: 55
End: 2020-12-29

## 2020-12-29 DIAGNOSIS — Z20.822 ENCOUNTER FOR LABORATORY TESTING FOR COVID-19 VIRUS: Primary | ICD-10-CM

## 2020-12-29 NOTE — PROGRESS NOTES
Pt presents to the flu clinic today requesting a covid test. Pt denies symptoms but has had a possible exposure. Pt declined to be seen by a doctor today.  RPG    Verbal consent received to perform test.

## 2020-12-31 LAB — SARS-COV-2, NAA: NOT DETECTED

## 2021-01-05 ENCOUNTER — TELEPHONE (OUTPATIENT)
Dept: INTERNAL MEDICINE CLINIC | Age: 56
End: 2021-01-05

## 2021-01-05 NOTE — TELEPHONE ENCOUNTER
Pt called upset wanting to know her results. As per VORB from Dr. Menjivar Mertammie -19 test was not detected.

## 2021-02-05 ENCOUNTER — OFFICE VISIT (OUTPATIENT)
Dept: PRIMARY CARE CLINIC | Age: 56
End: 2021-02-05

## 2021-02-05 DIAGNOSIS — Z20.822 ENCOUNTER FOR LABORATORY TESTING FOR COVID-19 VIRUS: Primary | ICD-10-CM

## 2021-02-05 NOTE — PROGRESS NOTES
Pt presents to the flu clinic for covid testing. Pt reports mild sx and possible exposure. Pt declined to see a provider.  Verbal consent received to perform test.  LIMA

## 2021-02-07 LAB — SARS-COV-2, NAA: DETECTED

## 2021-02-09 ENCOUNTER — TELEPHONE (OUTPATIENT)
Dept: INTERNAL MEDICINE CLINIC | Age: 56
End: 2021-02-09

## 2021-02-26 ENCOUNTER — VIRTUAL VISIT (OUTPATIENT)
Dept: INTERNAL MEDICINE CLINIC | Age: 56
End: 2021-02-26
Payer: COMMERCIAL

## 2021-02-26 DIAGNOSIS — F32.1 EPISODE OF MODERATE MAJOR DEPRESSION (HCC): ICD-10-CM

## 2021-02-26 DIAGNOSIS — U07.1 COVID-19: Primary | ICD-10-CM

## 2021-02-26 PROBLEM — I10 ESSENTIAL HYPERTENSION: Status: RESOLVED | Noted: 2017-08-10 | Resolved: 2021-02-26

## 2021-02-26 PROCEDURE — 99214 OFFICE O/P EST MOD 30 MIN: CPT | Performed by: FAMILY MEDICINE

## 2021-02-26 NOTE — PROGRESS NOTES
Subjective:   Jessenia Del Rio is a 60 y.o. female who complains of congestion, sore throat, cough described as productive of red sputum, myalgias and dyspnea for 2/7/21 days, gradually improving since that time. Loss of some taste, very weak.  +covid  She denies a history of fevers and diarrhea  Exposed to OSBALDO. Other family members. Evaluation to date: went to [facility] tested +. Treatment to date: OTC products. Patient does not smoke cigarettes. Relevant PMH: No pertinent additional PMH. Allergies   Allergen Reactions    Codeine Itching    Hydrochlorothiazide Myalgia         Patient Active Problem List    Diagnosis Date Noted    Essential hypertension 08/10/2017    Eczema 08/19/2016    Allergic rhinitis 08/16/2016     Current Outpatient Medications   Medication Sig Dispense Refill    sertraline (ZOLOFT) 25 mg tablet Take 1 Tab by mouth daily. 30 Tab 5    loratadine (CLARITIN) 10 mg tablet Take 2 Tabs by mouth nightly. 60 Tab 5    fluticasone propionate (FLONASE) 50 mcg/actuation nasal spray Use 2 spray(s) in each nostril once daily 16 g 5    betamethasone dipropionate (DIPROSONE) 0.05 % topical cream Apply  to affected area two (2) times a day. 15 g 1    albuterol (PROVENTIL HFA, VENTOLIN HFA, PROAIR HFA) 90 mcg/actuation inhaler Take 1 Puff by inhalation every six (6) hours as needed for Wheezing. 1 Inhaler 1    hydrocortisone (HYTONE) 2.5 % topical cream Apply  to affected area two (2) times a day. use thin layer 30 g 1    OTHER,NON-FORMULARY, 1 Cap daily. Greens herbals        Allergies   Allergen Reactions    Codeine Itching    Hydrochlorothiazide Myalgia     Social History     Tobacco Use    Smoking status: Never Smoker    Smokeless tobacco: Never Used   Substance Use Topics    Alcohol use: No     Alcohol/week: 0.0 standard drinks        Review of Systems  Pertinent items are noted in HPI.     Objective:     Visit Vitals  [facility] 09/01/2011     General:  fatigued, no distress   Eyes: negative   Ears:    Sinuses:    Mouth:     Neck:    Heart:    Lungs:    Abdomen:       Assessment/Plan:   Pneumonia, covid  Note for work    RTC in 3 months or PRN. Encounter Diagnoses   Name Primary?  COVID-19 Yes    Episode of moderate major depression ([facility]           Alicia Ferrell is a 60 y.o. female who was phone evaluated on 2/26/2021. Consent:  She and/or her healthcare decision maker is aware that this patient-initiated Telehealth encounter is a billable service, with coverage as determined by her insurance carrier. She is aware that she may receive a bill and has provided verbal consent to proceed: Yes    I was in the office while conducting this encounter. Assessment & Plan:   Diagnoses and all orders for this visit:    1. COVID-19    2. Episode of moderate major depression ([facility]          Follow-up and Dispositions    · Return in about 3 weeks (around 3/19/2021) for routine follow up. 712  Subjective:      As above      We discussed the expected course, resolution and complications of the diagnosis(es) in detail. Medication risks, benefits, costs, interactions, and alternatives were discussed as indicated. I advised her to contact the office if her condition worsens, changes or fails to improve as anticipated. She expressed understanding with the diagnosis(es) and plan. Pursuant to the emergency declaration under the [facility], 1135 waiver authority and the [employer] Act, this Virtual  Visit was conducted, with patient's consent, to reduce the patient's risk of exposure to COVID-19 and provide continuity of care for an established patient. Services were provided through a video synchronous discussion virtually to substitute for in-person clinic visit.     Navi Castro MD

## 2021-02-26 NOTE — PROGRESS NOTES
Chief Complaint   Patient presents with    Concern For COVID-19 (Coronavirus)     Health Maintenance reviewed. I have reviewed the patient's medical history in detail and updated the computerized patient record. 1. Have you been to the ER, urgent care clinic since your last visit? Hospitalized since your last visit?no    2. Have you seen or consulted any other health care providers outside of the [address] [place] [address] since your last visit? Include any pap smears or colon screening. no    Patient has not been out of the country in (12 months), NO diarrhea, NO cough, NO chest conjestion, NO temp. Pt has not been around anyone with these symptoms. Encouraged pt to discuss pt's wishes with spouse/partner/family and bring them in the next appt to follow thru with the Advanced Directive    Fall Risk Assessment, last 12 mths 2/26/2021   Able to walk? Yes   Fall in past 12 months? 0   Do you feel unsteady? 0   Are you worried about falling 0       3 most recent PHQ Screens 2/26/2021   Little interest or pleasure in doing things Several days   Feeling down, depressed, irritable, or hopeless Several days   Total Score PHQ 2 2   Trouble falling or staying asleep, or sleeping too much -   Feeling tired or having little energy -   Poor appetite, weight loss, or overeating -   Feeling bad about yourself - or that you are a failure or have let yourself or your family down -   Trouble concentrating on things such as school, work, reading, or watching TV -   Moving or speaking so slowly that other people could have noticed; or the opposite being so fidgety that others notice -   Thoughts of being better off dead, or hurting yourself in some way -   PHQ 9 Score -   How difficult have these problems made it for you to do your work, take care of your home and get along with others -       Abuse Screening Questionnaire 2/26/2021   Do you ever feel afraid of your partner?  N   Are you in a relationship with someone who physically or mentally threatens you? N   Is it safe for you to go home?  Y       ADL Assessment 2/26/2021   Feeding yourself No Help Needed   Getting from bed to chair No Help Needed   Getting dressed No Help Needed   Bathing or showering No Help Needed   Walk across the room (includes cane/walker) No Help Needed   Using the telphone No Help Needed   Taking your medications No Help Needed   Preparing meals No Help Needed   Managing money (expenses/bills) No Help Needed   Moderately strenuous housework (laundry) No Help Needed   Shopping for personal items (toiletries/medicines) No Help Needed   Shopping for groceries No Help Needed   Driving No Help Needed   Climbing a flight of stairs No Help Needed   Getting to places beyond walking distances No Help Needed

## 2021-03-04 ENCOUNTER — TELEPHONE (OUTPATIENT)
Dept: INTERNAL MEDICINE CLINIC | Age: 56
End: 2021-03-04

## 2021-03-04 NOTE — TELEPHONE ENCOUNTER
Patient checking status on he STD (Short term disability) paper work that was faxed over, she said she does not get paid until it is filled out and sent back. Coming fr [facility]!        # [phone]

## 2021-03-10 ENCOUNTER — OFFICE VISIT (OUTPATIENT)
Dept: INTERNAL MEDICINE CLINIC | Age: 56
End: 2021-03-10
Payer: COMMERCIAL

## 2021-03-10 VITALS
TEMPERATURE: 97.3 F | WEIGHT: 182 LBS | OXYGEN SATURATION: 97 % | HEIGHT: 63 IN | HEART RATE: 92 BPM | BODY MASS INDEX: 32.25 KG/M2 | RESPIRATION RATE: 20 BRPM | DIASTOLIC BLOOD PRESSURE: 85 MMHG | SYSTOLIC BLOOD PRESSURE: 120 MMHG

## 2021-03-10 DIAGNOSIS — F32.1 DEPRESSION, MAJOR, SINGLE EPISODE, MODERATE (HCC): ICD-10-CM

## 2021-03-10 DIAGNOSIS — J12.82 PNEUMONIA DUE TO COVID-19 VIRUS: Primary | ICD-10-CM

## 2021-03-10 DIAGNOSIS — U07.1 PNEUMONIA DUE TO COVID-19 VIRUS: Primary | ICD-10-CM

## 2021-03-10 PROCEDURE — 99213 OFFICE O/P EST LOW 20 MIN: CPT | Performed by: FAMILY MEDICINE

## 2021-03-10 NOTE — PROGRESS NOTES
Subjective:   Faye Jane is a 60 y.o. female who complains of congestion, sore throat, cough described as productive of bloody sputum, myalgias, headache and dyspneA  for 34 days, gradually improving since that time. She denies a history of fevers, rash on BODY and wheezing. Still very weak. Still gets dyspnic with ordinary activities. Evaluation to date: tested + for covid on the 2/8/21. Last day of work 2/5/21   Treatment to date: OTC products. Patient does not smoke cigarettes. Relevant PMH: No pertinent additional PMH. Mood has been OKay    Allergies   Allergen Reactions    Codeine Itching    Hydrochlorothiazide Myalgia         Patient Active Problem List    Diagnosis Date Noted    Depression, major, single episode, moderate ([facility] 02/26/2021    Eczema 08/19/2016    Allergic rhinitis 08/16/2016     Current Outpatient Medications   Medication Sig Dispense Refill    sertraline (ZOLOFT) 25 mg tablet Take 1 Tab by mouth daily. 30 Tab 5    loratadine (CLARITIN) 10 mg tablet Take 2 Tabs by mouth nightly. 60 Tab 5    fluticasone propionate (FLONASE) 50 mcg/actuation nasal spray Use 2 spray(s) in each nostril once daily 16 g 5    betamethasone dipropionate (DIPROSONE) 0.05 % topical cream Apply  to affected area two (2) times a day. 15 g 1    albuterol (PROVENTIL HFA, VENTOLIN HFA, PROAIR HFA) 90 mcg/actuation inhaler Take 1 Puff by inhalation every six (6) hours as needed for Wheezing. 1 Inhaler 1    hydrocortisone (HYTONE) 2.5 % topical cream Apply  to affected area two (2) times a day. use thin layer 30 g 1    OTHER,NON-FORMULARY, 1 Cap daily.  [facility] herbals        Allergies   Allergen Reactions    Codeine Itching    Hydrochlorothiazide Myalgia     Social History     Tobacco Use    Smoking status: Never Smoker    Smokeless tobacco: Never Used   Substance Use Topics    Alcohol use: No     Alcohol/week: 0.0 standard drinks        Review of Systems  Pertinent items are noted in HPI.    Objective:     Visit Vitals  /85 (BP 1 Location: Left arm, BP Patient Position: Sitting, BP Cuff Size: Adult)   Pulse 92   Temp 97.3 °F (36.3 °C) (Temporal)   Resp 20   Ht 5' 2.5\" (1.588 m)   Wt 182 lb (82.6 kg)   LMP 09/01/2011   SpO2 97%   BMI 32.76 kg/m²     General:  alert, fatigued, cooperative, no distress   Eyes: negative   Ears: normal TM's and external ear canals AU   Sinuses: Normal paranasal sinuses without tenderness   Mouth:  Lips, mucosa, and tongue normal. Teeth and gums normal   Neck: supple, symmetrical, trachea midline and no adenopathy. Heart: S1 and S2 normal, no murmurs noted. Lungs: clear to auscultation bilaterally   Abdomen: soft, non-tender. Bowel sounds normal. No masses,  no organomegaly    bump healing upper left abdo   sl swollen right hand  bitten    Assessment/Plan:   viral upper respiratory illness and pneumonia from covid  RTC in 3 weeks or PRN. Discussed diagnosis and treatment of viral URIs. Discussed the importance of avoiding unnecessary antibiotic therapy. ICD-10-CM ICD-9-CM    1. Pneumonia due to COVID-19 virus  U07.1 480.8 NOVEL CORONAVIRUS (COVID-19)    J12.82     2. Depression, major, single episode, moderate ([facility] [address]  F32.1 296.22        Orders Placed This Encounter    NOVEL CORONAVIRUS (COVID-19)   . Probably can't return to work till April. Follow-up and Dispositions    · Return in about 3 weeks (around 3/31/2021).

## 2021-03-10 NOTE — PROGRESS NOTES
Chief Complaint   Patient presents with    Concern For COVID-19 (Coronavirus)     Covid FU - SOB, fatigued    Cough     2 weeks ago - bloody sputum on tissue after taking her breathing exercises - x4 day bloody sputum on tissue then stopped     Health Maintenance reviewed. I have reviewed the patient's medical history in detail and updated the computerized patient record. Patient has not been out of the country in (12 months), NO diarrhea, NO cough, NO chest conjestion, NO temp. Pt has not been around anyone with these symptoms. 1. Have you been to the ER, urgent care clinic since your last visit? Hospitalized since your last visit?no    2. Have you seen or consulted any other health care providers outside of the [address] [place] [address] since your last visit? Include any pap smears or colon screening. No      Encouraged pt to discuss pt's wishes with spouse/partner/family and bring them in the next appt to follow thru with the Advanced Directive    Fall Risk Assessment, last 12 mths 3/10/2021   Able to walk? Yes   Fall in past 12 months? 1   Do you feel unsteady? 0   Are you worried about falling 0   Is the gait abnormal? 0   Number of falls in past 12 months 2   Fall with injury?  1       3 most recent PHQ Screens 3/10/2021   Little interest or pleasure in doing things Several days   Feeling down, depressed, irritable, or hopeless Several days   Total Score PHQ 2 2   Trouble falling or staying asleep, or sleeping too much -   Feeling tired or having little energy -   Poor appetite, weight loss, or overeating -   Feeling bad about yourself - or that you are a failure or have let yourself or your family down -   Trouble concentrating on things such as school, work, reading, or watching TV -   Moving or speaking so slowly that other people could have noticed; or the opposite being so fidgety that others notice -   Thoughts of being better off dead, or hurting yourself in some way -   PHQ 9 Score - How difficult have these problems made it for you to do your work, take care of your home and get along with others -       Abuse Screening Questionnaire 3/10/2021   Do you ever feel afraid of your partner? N   Are you in a relationship with someone who physically or mentally threatens you? N   Is it safe for you to go home?  Y       ADL Assessment 3/10/2021   Feeding yourself No Help Needed   Getting from bed to chair No Help Needed   Getting dressed No Help Needed   Bathing or showering No Help Needed   Walk across the room (includes cane/walker) No Help Needed   Using the telphone No Help Needed   Taking your medications No Help Needed   Preparing meals No Help Needed   Managing money (expenses/bills) No Help Needed   Moderately strenuous housework (laundry) Help Needed   Shopping for personal items (toiletries/medicines) No Help Needed   Shopping for groceries No Help Needed   Driving No Help Needed   Climbing a flight of stairs No Help Needed   Getting to places beyond walking distances No Help Needed

## 2021-03-10 NOTE — LETTER
NOTIFICATION RETURN TO WORK / SCHOOL 
 
3/10/2021 4:01 PM 
 
Ms. Jamil Echols [address] [facility] [address] To Whom It May Concern: 
 
Jamil Echols is currently under the care of [address]. She will return to work/school on: April 1, 2021. If there are questions or concerns please have the patient contact our office. Sincerely, Tita Muro MD

## 2021-03-18 ENCOUNTER — DOCUMENTATION ONLY (OUTPATIENT)
Dept: INTERNAL MEDICINE CLINIC | Age: 56
End: 2021-03-18

## 2021-03-21 PROBLEM — U07.1 COVID-19: Status: ACTIVE | Noted: 2021-03-21

## 2021-03-31 ENCOUNTER — OFFICE VISIT (OUTPATIENT)
Dept: INTERNAL MEDICINE CLINIC | Age: 56
End: 2021-03-31
Payer: COMMERCIAL

## 2021-03-31 VITALS
TEMPERATURE: 98.1 F | WEIGHT: 180 LBS | HEIGHT: 63 IN | RESPIRATION RATE: 18 BRPM | HEART RATE: 85 BPM | DIASTOLIC BLOOD PRESSURE: 85 MMHG | SYSTOLIC BLOOD PRESSURE: 129 MMHG | BODY MASS INDEX: 31.89 KG/M2 | OXYGEN SATURATION: 96 %

## 2021-03-31 DIAGNOSIS — N39.41 URGE INCONTINENCE OF URINE: ICD-10-CM

## 2021-03-31 DIAGNOSIS — F32.1 EPISODE OF MODERATE MAJOR DEPRESSION (HCC): ICD-10-CM

## 2021-03-31 DIAGNOSIS — R06.02 SHORTNESS OF BREATH: ICD-10-CM

## 2021-03-31 DIAGNOSIS — R29.898 WEAKNESS OF BOTH LEGS: ICD-10-CM

## 2021-03-31 DIAGNOSIS — U07.1 COVID-19: Primary | ICD-10-CM

## 2021-03-31 PROCEDURE — 99214 OFFICE O/P EST MOD 30 MIN: CPT | Performed by: FAMILY MEDICINE

## 2021-03-31 RX ORDER — SERTRALINE HYDROCHLORIDE 25 MG/1
25 TABLET, FILM COATED ORAL DAILY
Qty: 30 TAB | Refills: 5 | Status: SHIPPED | OUTPATIENT
Start: 2021-03-31 | End: 2021-11-20

## 2021-03-31 NOTE — LETTER
NOTIFICATION RETURN TO WORK / SCHOOL 
 
3/31/2021 11:19 AM 
 
Ms. Chioma Andres [address] [facility] [id number] To Whom It May Concern: 
 
Chioma Andres is currently under the care of [address]. She will return to work/school on: May 3, 2021 If there are questions or concerns please have the patient contact our office. Sincerely, Marilyn Gaston MD

## 2021-03-31 NOTE — PROGRESS NOTES
Chief Complaint   Patient presents with    Concern For COVID-19 (Coronavirus)     Covid FU documentation      Patient has not been out of the country in (14 months), NO diarrhea, NO cough, NO chest conjestion, NO temp. Pt has not been around anyone with these symptoms. Health Maintenance reviewed. I have reviewed the patient's medical history in detail and updated the computerized patient record. 1. Have you been to the ER, urgent care clinic since your last visit? Hospitalized since your last visit?no    2. Have you seen or consulted any other health care providers outside of the [address] [place] [address] since your last visit? Include any pap smears or colon screening. no    Encouraged pt to discuss pt's wishes with spouse/partner/family and bring them in the next appt to follow thru with the Advanced Directive    Fall Risk Assessment, last 12 mths 3/31/2021   Able to walk? Yes   Fall in past 12 months? 1   Do you feel unsteady? -   Are you worried about falling 1   Is the gait abnormal? 0   Number of falls in past 12 months 2   Fall with injury?  1       3 most recent PHQ Screens 3/31/2021   Little interest or pleasure in doing things More than half the days   Feeling down, depressed, irritable, or hopeless More than half the days   Total Score PHQ 2 4   Trouble falling or staying asleep, or sleeping too much -   Feeling tired or having little energy -   Poor appetite, weight loss, or overeating -   Feeling bad about yourself - or that you are a failure or have let yourself or your family down -   Trouble concentrating on things such as school, work, reading, or watching TV -   Moving or speaking so slowly that other people could have noticed; or the opposite being so fidgety that others notice -   Thoughts of being better off dead, or hurting yourself in some way -   PHQ 9 Score -   How difficult have these problems made it for you to do your work, take care of your home and get along with others - Abuse Screening Questionnaire 3/31/2021   Do you ever feel afraid of your partner? N   Are you in a relationship with someone who physically or mentally threatens you? N   Is it safe for you to go home?  Y       ADL Assessment 3/31/2021   Feeding yourself No Help Needed   Getting from bed to chair No Help Needed   Getting dressed No Help Needed   Bathing or showering No Help Needed   Walk across the room (includes cane/walker) No Help Needed   Using the telphone No Help Needed   Taking your medications No Help Needed   Preparing meals No Help Needed   Managing money (expenses/bills) No Help Needed   Moderately strenuous housework (laundry) No Help Needed   Shopping for personal items (toiletries/medicines) No Help Needed   Shopping for groceries No Help Needed   Driving No Help Needed   Climbing a flight of stairs No Help Needed   Getting to places beyond walking distances No Help Needed

## 2021-03-31 NOTE — PATIENT INSTRUCTIONS
Kegel Exercises: Care Instructions  Your Care Instructions     Kegel exercises strengthen muscles around the bladder. These muscles control the flow of urine. Kegel exercises are sometime called \"pelvic floor\" exercises. They can help prevent urine leakage and keep the pelvic organs in place. A woman who just had a baby might want to try Kegel exercises. They can strengthen pelvic muscles that have been weakened by pregnancy and childbirth. A man or woman may use Kegel exercises to treat urine leakage. You do Kegel exercises by tightening the muscles you use when you urinate. You will likely need to do these exercises for several weeks to get better. Follow-up care is a key part of your treatment and safety. Be sure to make and go to all appointments, and call your doctor if you are having problems. It's also a good idea to know your test results and keep a list of the medicines you take. How can you care for yourself at home? · Do Kegel exercises. ? Find the muscles you need to strengthen. To do this, tighten the muscles that stop your urine while you are going to the bathroom. These are the same muscles you squeeze during Kegel exercises. ? Squeeze the muscles as hard as you can. Your belly and thighs should not move. ? Hold the squeeze for 3 seconds. Then relax for 3 seconds. ? Start with 3 seconds, and then add 1 second each week until you are able to squeeze for 10 seconds. ? Repeat the exercise 10 to 15 times for each session. Do three or more sessions each day. · You can check to see if you are using the right muscles. Place a finger in your vagina and squeeze around it. You are doing them right when you feel pressure around your finger. Your doctor may also suggest that you put special weights in your vagina while you do the exercises. · Do not smoke. It can irritate the bladder. If you need help quitting, talk to your doctor about stop-smoking programs and medicines.  These can increase your chances of quitting for good. Where can you learn more? Go to http://www.gray.com/  Enter T630 in the search box to learn more about \"Kegel Exercises: Care Instructions. \"  Current as of: June 29, 2020               Content Version: 12.6  © 4494-9294 [employer]. Care instructions adapted under license by [employer] (which disclaims liability or warranty for this information). If you have questions about a medical condition or this instruction, always ask your healthcare professional. [address] any warranty or liability for your use of this information.

## 2021-04-03 NOTE — PROGRESS NOTES
HISTORY OF PRESENT ILLNESS  Janice Gamboa is a 60 y.o. female. Breathing Problem  The history is provided by the patient. This is a new problem. The problem occurs intermittently. Episode onset: Since diagnosed with Covid added mid February. The problem has been gradually improving. Pertinent negatives include no fever, no sore throat, no cough and no rash. Associated symptoms comments: Generalized weakness still gets short of breath. She has tried nothing for the symptoms. She has had no prior hospitalizations. Associated medical issues include pneumonia. Associated medical issues do not include chronic lung disease or heart failure. Complaints that when she asked to urinate or defecate must get to the restroom very quickly otherwise has some incontinence. Review of Systems   Constitutional: Negative for fever. HENT: Negative for sore throat. Respiratory: Negative for cough. Skin: Negative for rash.      Patient Active Problem List   Diagnosis Code    Allergic rhinitis J30.9    Eczema L30.9    Depression, major, single episode, moderate ([facility] F32.1    COVID-19 U07.1     Social History     Socioeconomic History    Marital status:      Spouse name: Not on file    Number of children: 2    Years of education: Not on file    Highest education level: Not on file   Occupational History    Occupation:      Employer: [employer]   Social Needs    Financial resource strain: Not on file    Food insecurity     Worry: Not on file     Inability: Not on file   [employer] needs     Medical: Not on file     Non-medical: Not on file   Tobacco Use    Smoking status: Never Smoker    Smokeless tobacco: Never Used   Substance and Sexual Activity    Alcohol use: No     Alcohol/week: 0.0 standard drinks    Drug use: No    Sexual activity: Not Currently   Lifestyle    Physical activity     Days per week: Not on file     Minutes per session: Not on file    Stress: Not on file Relationships    Social connections     Talks on phone: Not on file     Gets together: Not on file     Attends Jehovah's witness service: Not on file     Active member of club or organization: Not on file     Attends meetings of clubs or organizations: Not on file     Relationship status: Not on file    Intimate partner violence     Fear of current or ex partner: Not on file     Emotionally abused: Not on file     Physically abused: Not on file     Forced sexual activity: Not on file   Other Topics Concern    Not on file   Social History Narrative     2 kids       Physical Exam  Visit Vitals  /85 (BP 1 Location: Left arm, BP Patient Position: Sitting, BP Cuff Size: Adult)   Pulse 85   Temp 98.1 °F (36.7 °C) (Temporal)   Resp 18   Ht 5' 2.5\" (1.588 m)   Wt 180 lb (81.6 kg)   LMP 09/01/2011   SpO2 96%   BMI 32.40 kg/m²     WD WN female NAD  Heart RRR without murmers clicks or rubs  Lungs CTA  Abdo soft nontender  Ext no edema  ASSESSMENT and PLAN  Encounter Diagnoses   Name Primary?  COVID-19 Yes    Urge incontinence of urine     Weakness of both legs     Shortness of breath     Episode of moderate major depression (HCC)      Orders Placed This Encounter    AMB SUPPLY ORDER    XR CHEST PA LAT    sertraline (ZOLOFT) 25 mg tablet     I am going to put her out a little longer, send to physical therapy. She may be able to return to work in May hopefully this is not prolonged Covid illness. Since still symptomatic work-up as above  See patient instructions, went over them personally with the patient. Emphasized compliance. Questions answered. Patient states that they understand the plan of action and will call if there are any issues or misunderstandings. Follow-up and Dispositions    · Return in about 3 weeks (around 4/21/2021) for routine follow up.

## 2021-04-07 ENCOUNTER — DOCUMENTATION ONLY (OUTPATIENT)
Dept: INTERNAL MEDICINE CLINIC | Age: 56
End: 2021-04-07

## 2021-04-07 ENCOUNTER — TELEPHONE (OUTPATIENT)
Dept: INTERNAL MEDICINE CLINIC | Age: 56
End: 2021-04-07
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
## 2021-04-07 NOTE — TELEPHONE ENCOUNTER
Message fr miranda        Reason for call: She called [facility] and they are booked out 3 weeks, she is asking if there is another facility that she can go to. Callback required yes/no and why: Yes     Best contact number(s): [phone]     Details to clarify the request: You can leave a detailed message if she does not answer.        Rena Garcia

## 2021-04-07 NOTE — TELEPHONE ENCOUNTER
Returned pt's call and encouraged pt to contact [facility] to see if they are in network and how long of a wait

## 2021-04-21 ENCOUNTER — OFFICE VISIT (OUTPATIENT)
Dept: INTERNAL MEDICINE CLINIC | Age: 56
End: 2021-04-21
Payer: COMMERCIAL

## 2021-04-21 VITALS
HEART RATE: 85 BPM | OXYGEN SATURATION: 96 % | WEIGHT: 183 LBS | HEIGHT: 63 IN | RESPIRATION RATE: 20 BRPM | DIASTOLIC BLOOD PRESSURE: 78 MMHG | BODY MASS INDEX: 32.43 KG/M2 | SYSTOLIC BLOOD PRESSURE: 122 MMHG | TEMPERATURE: 98.1 F

## 2021-04-21 DIAGNOSIS — F32.1 DEPRESSION, MAJOR, SINGLE EPISODE, MODERATE (HCC): ICD-10-CM

## 2021-04-21 DIAGNOSIS — U07.1 COVID-19: Primary | ICD-10-CM

## 2021-04-21 DIAGNOSIS — R06.09 DYSPNEA ON EXERTION: ICD-10-CM

## 2021-04-21 PROBLEM — L65.9 ALOPECIA: Status: ACTIVE | Noted: 2021-04-21

## 2021-04-21 PROCEDURE — 93000 ELECTROCARDIOGRAM COMPLETE: CPT | Performed by: FAMILY MEDICINE

## 2021-04-21 PROCEDURE — 99214 OFFICE O/P EST MOD 30 MIN: CPT | Performed by: FAMILY MEDICINE

## 2021-04-21 NOTE — PROGRESS NOTES
Chief Complaint   Patient presents with    Leg Pain     FU from PT and Covid      Patient has not been out of the country in (14 months), NO diarrhea, NO cough, NO chest conjestion, NO temp. Pt has not been around anyone with these symptoms. Health Maintenance reviewed. I have reviewed the patient's medical history in detail and updated the computerized patient record. 1. Have you been to the ER, urgent care clinic since your last visit? Hospitalized since your last visit? 2. Have you seen or consulted any other health care providers outside of the [address] [place] [address] since your last visit? Include any pap smears or colon screening. Encouraged pt to discuss pt's wishes with spouse/partner/family and bring them in the next appt to follow thru with the Advanced Directive    @  [address], last 12 mths 4/21/2021   Able to walk? Yes   Fall in past 12 months? 1   Do you feel unsteady? 0   Are you worried about falling 1   Is the gait abnormal? 0   Number of falls in past 12 months 2   Fall with injury?  1       3 most recent PHQ Screens 4/21/2021   Little interest or pleasure in doing things More than half the days   Feeling down, depressed, irritable, or hopeless More than half the days   Total Score PHQ 2 4   Trouble falling or staying asleep, or sleeping too much -   Feeling tired or having little energy -   Poor appetite, weight loss, or overeating -   Feeling bad about yourself - or that you are a failure or have let yourself or your family down -   Trouble concentrating on things such as school, work, reading, or watching TV -   Moving or speaking so slowly that other people could have noticed; or the opposite being so fidgety that others notice -   Thoughts of being better off dead, or hurting yourself in some way -   PHQ 9 Score -   How difficult have these problems made it for you to do your work, take care of your home and get along with others -       Abuse Screening Questionnaire 4/21/2021   Do you ever feel afraid of your partner? N   Are you in a relationship with someone who physically or mentally threatens you? N   Is it safe for you to go home?  Y       ADL Assessment 4/21/2021   Feeding yourself No Help Needed   Getting from bed to chair No Help Needed   Getting dressed No Help Needed   Bathing or showering No Help Needed   Walk across the room (includes cane/walker) No Help Needed   Using the telphone No Help Needed   Taking your medications No Help Needed   Preparing meals No Help Needed   Managing money (expenses/bills) No Help Needed   Moderately strenuous housework (laundry) No Help Needed   Shopping for personal items (toiletries/medicines) No Help Needed   Shopping for groceries No Help Needed   Driving No Help Needed   Climbing a flight of stairs No Help Needed   Getting to places beyond walking distances No Help Needed

## 2021-04-21 NOTE — PROGRESS NOTES
HISTORY OF PRESENT ILLNESS  Janice Gamboa is a 60 y.o. female. Leg Pain   The history is provided by the patient. This is a chronic problem. Episode onset: since covid covid Dx in Feb. The problem occurs hourly. The problem has been gradually improving. The pain is present in the left upper leg, right upper leg, right lower leg, left lower leg, left ankle and right ankle. The quality of the pain is described as aching. The pain is at a severity of 4/10. Associated symptoms include back pain. The symptoms are aggravated by movement. Treatments tried: doing PT. The treatment provided mild relief. 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 She is getting stronger notes a loss of muscle mass after her illness. Mood is been okay  Gets short of breath with relatively mild exertions. Wt Readings from Last 3 Encounters:   04/21/21 183 lb (83 kg)   03/31/21 180 lb (81.6 kg)   03/10/21 182 lb (82.6 kg)       Review of Systems   Constitutional: Negative for fever. Respiratory: Positive for cough and shortness of breath. Cardiovascular: Negative for chest pain. Musculoskeletal: Positive for back pain and myalgias. Negative for falls. Psychiatric/Behavioral: Negative for depression.      Social History     Socioeconomic History    Marital status:      Spouse name: Not on file    Number of children: 2    Years of education: Not on file    Highest education level: Not on file   Occupational History    Occupation:      Employer: [employer]   Social Needs    Financial resource strain: Not on file    Food insecurity     Worry: Not on file     Inability: Not on file   [employer] needs     Medical: Not on file     Non-medical: Not on file   Tobacco Use    Smoking status: Never Smoker    Smokeless tobacco: Never Used   Substance and Sexual Activity    Alcohol use: No     Alcohol/week: 0.0 standard drinks    Drug use: No    Sexual activity: Not Currently   Lifestyle    Physical activity     Days per week: Not on file     Minutes per session: Not on file    Stress: Not on file   Relationships    Social connections     Talks on phone: Not on file     Gets together: Not on file     Attends Episcopal service: Not on file     Active member of club or organization: Not on file     Attends meetings of clubs or organizations: Not on file     Relationship status: Not on file    Intimate partner violence     Fear of current or ex partner: Not on file     Emotionally abused: Not on file     Physically abused: Not on file     Forced sexual activity: Not on file   Other Topics Concern    Not on file   Social History Narrative     2 kids       Physical Exam  Visit Vitals  /78 (BP 1 Location: Left arm, BP Patient Position: Sitting, BP Cuff Size: Adult)   Pulse 85   Temp 98.1 °F (36.7 °C) (Oral)   Resp 20   Ht 5' 2.5\" (1.588 m)   Wt 183 lb (83 kg)   LMP 09/01/2011   SpO2 96%   BMI 32.94 kg/m²     WD WN female NAD  Heart RRR without murmers clicks or rubs  Lungs CTA  Abdo soft nontender  Ext no edema      ASSESSMENT and PLAN  Encounter Diagnoses   Name Primary? 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 Is making a slow recovery from significant [place] at 17 infection. We will put her out until June if she can return on a part-time basis we might let her go back sooner    Follow-up and Dispositions    · Return in about 4 weeks (around 5/19/2021).

## 2021-04-29 ENCOUNTER — DOCUMENTATION ONLY (OUTPATIENT)
Dept: INTERNAL MEDICINE CLINIC | Age: 56
End: 2021-04-29

## 2021-04-29 NOTE — PROGRESS NOTES
Faxed to [facility] [phone] with confirmation pt's last office visit (4-21-21)    Faxed to [facility] [place] [address] [phone] with confirmation copies of all orders, OVs etc as requested

## 2021-05-28 ENCOUNTER — OFFICE VISIT (OUTPATIENT)
Dept: INTERNAL MEDICINE CLINIC | Age: 56
End: 2021-05-28
Payer: COMMERCIAL

## 2021-05-28 VITALS
DIASTOLIC BLOOD PRESSURE: 89 MMHG | HEART RATE: 74 BPM | OXYGEN SATURATION: 95 % | RESPIRATION RATE: 16 BRPM | WEIGHT: 185.6 LBS | SYSTOLIC BLOOD PRESSURE: 130 MMHG | TEMPERATURE: 98.1 F | BODY MASS INDEX: 33.41 KG/M2

## 2021-05-28 DIAGNOSIS — M79.10 MYALGIA: Primary | ICD-10-CM

## 2021-05-28 DIAGNOSIS — L65.9 ALOPECIA: ICD-10-CM

## 2021-05-28 DIAGNOSIS — U07.1 COVID-19: ICD-10-CM

## 2021-05-28 DIAGNOSIS — F32.1 DEPRESSION, MAJOR, SINGLE EPISODE, MODERATE (HCC): ICD-10-CM

## 2021-05-28 PROCEDURE — 99214 OFFICE O/P EST MOD 30 MIN: CPT | Performed by: FAMILY MEDICINE

## 2021-05-28 NOTE — LETTER
NOTIFICATION RETURN TO WORK / SCHOOL 
 
5/28/2021 10:20 AM 
 
Ms. Vaughan Libman [address] [facility] [address] To Whom It May Concern: 
 
Vaughan Libman is currently under the care of [address]. She will return to work on June 24, 2021 If there are questions or concerns please have the patient contact our office. Sincerely, Tess Jaimes MD

## 2021-05-28 NOTE — PROGRESS NOTES
Chief Complaint   Patient presents with    Leg Pain     back pain, leg and feet pain     Patient has not been out of the country in (14 months), NO diarrhea, NO cough, NO chest conjestion, NO temp. Pt has not been around anyone with these symptoms. Health Maintenance reviewed. I have reviewed the patient's medical history in detail and updated the computerized patient record. 1. Have you been to the ER, urgent care clinic since your last visit? Hospitalized since your last visit? 2. Have you seen or consulted any other health care providers outside of the [address] [place] [address] since your last visit? Include any pap smears or colon screening. Encouraged pt to discuss pt's wishes with spouse/partner/family and bring them in the next appt to follow thru with the Advanced Directive    @  [address], last 12 mths 5/28/2021   Able to walk? Yes   Fall in past 12 months? 1   Do you feel unsteady? -   Are you worried about falling 1   Is the gait abnormal? 1   Number of falls in past 12 months 2   Fall with injury?  1       3 most recent PHQ Screens 5/28/2021   Little interest or pleasure in doing things Nearly every day   Feeling down, depressed, irritable, or hopeless Nearly every day   Total Score PHQ 2 6   Trouble falling or staying asleep, or sleeping too much -   Feeling tired or having little energy -   Poor appetite, weight loss, or overeating -   Feeling bad about yourself - or that you are a failure or have let yourself or your family down -   Trouble concentrating on things such as school, work, reading, or watching TV -   Moving or speaking so slowly that other people could have noticed; or the opposite being so fidgety that others notice -   Thoughts of being better off dead, or hurting yourself in some way -   PHQ 9 Score -   How difficult have these problems made it for you to do your work, take care of your home and get along with others -       Abuse Screening Questionnaire 5/28/2021   Do you ever feel afraid of your partner? N   Are you in a relationship with someone who physically or mentally threatens you? N   Is it safe for you to go home?  Y       ADL Assessment 5/28/2021   Feeding yourself No Help Needed   Getting from bed to chair No Help Needed   Getting dressed No Help Needed   Bathing or showering No Help Needed   Walk across the room (includes cane/walker) No Help Needed   Using the telphone No Help Needed   Taking your medications No Help Needed   Preparing meals No Help Needed   Managing money (expenses/bills) No Help Needed   Moderately strenuous housework (laundry) No Help Needed   Shopping for personal items (toiletries/medicines) No Help Needed   Shopping for groceries No Help Needed   Driving No Help Needed   Climbing a flight of stairs No Help Needed   Getting to places beyond walking distances No Help Needed

## 2021-05-28 NOTE — PROGRESS NOTES
HISTORY OF PRESENT ILLNESS  Lynn Torres is a 60 y.o. female. Leg Pain   The history is provided by the patient. This is a chronic problem. Episode onset: 2/5/2021. The problem occurs constantly (back pain). The problem has been gradually worsening. The pain is present in the left upper leg, left lower leg, right upper leg, right lower leg, left hip and right hip. The quality of the pain is described as aching. The pain is moderate. Associated symptoms include back pain. There has been no history of extremity trauma. Review of Systems   Constitutional: Negative for fever and weight loss. Respiratory: Positive for shortness of breath. Negative for cough. Musculoskeletal: Positive for back pain, falls and myalgias. Patient Active Problem List   Diagnosis Code    Allergic rhinitis J30.9    Eczema L30.9    Depression, major, single [facility] F32.1    COVID-19 U07.1    Alopecia L65.9     Social History     Socioeconomic History    Marital status:      Spouse name: Not on file    Number of children: 2    Years of education: Not on file    Highest education level: Not on file   Occupational History    Occupation:      Employer: [employer]   Tobacco Use    Smoking status: Never Smoker    Smokeless tobacco: Never Used   Substance and Sexual Activity    Alcohol use: No     Alcohol/week: 0.0 standard drinks    Drug use: No    Sexual activity: Not Currently   Other Topics Concern    Not on file   Social History Narrative     2 kids     Social Determinants of Health     Financial Resource Strain:     Difficulty of Paying Living Expenses:    Food Insecurity:     Worried About Running Out of Food in the Last Year:     [address] in the Last Year:    Transportation Needs:     Lack of Transportation (Medical):      Lack of Transportation (Non-Medical):    Physical Activity:     Days of Exercise per Week:     Minutes of Exercise per Session: Stress:     Feeling of Stress :    Social Connections:     Frequency of Communication with Friends and Family:     Frequency of Social Gatherings with Friends and Family:     Attends Orthodox Services:     Active Member of Clubs or Organizations:     Attends Club or Organization Meetings:     Marital Status:    Intimate Partner Violence:     Fear of Current or Ex-Partner:     Emotionally Abused:     Physically Abused:     Sexually Abused:        Physical Exam  Visit Vitals  /89 (BP 1 Location: Left arm, BP Patient Position: Sitting, BP Cuff Size: Adult)   Pulse 74   Temp 98.1 °F (36.7 °C) (Oral)   Resp 16   Wt 185 lb 9.6 oz (84.2 kg)   LMP 09/01/2011   SpO2 95%   BMI 33.41 kg/m²     WD WN female NAD  Heart RRR without murmers clicks or rubs  Lungs CTA  Abdo soft nontender  Ext no edema    ASSESSMENT and PLAN  Encounter Diagnoses   Name Primary?  Myalgia Yes    Depression, major, single episode, moderate (HCC)     Alopecia     COVID-19      Orders Placed This Encounter    METABOLIC PANEL, COMPREHENSIVE    CBC WITH AUTOMATED DIFF    TSH 3RD GENERATION    RA + CCP ABS    SED RATE (ESR)    CK     We discussed the covid vaccine. Discussed availability and how we prioritize patients to get it. Resources discussed. We discussed how important it is to get the vaccine and the relatively low side affects from it. Prolonged symptoms from probable Covid disease, long-haul or syndrome.   Encouraged to get the vaccine which has been shown to help  Further work-up as above    Follow-up 2 months

## 2021-06-06 LAB
ALBUMIN SERPL-MCNC: 4.7 G/DL (ref 3.8–4.9)
ALBUMIN/GLOB SERPL: 1.9 {RATIO} (ref 1.2–2.2)
ALP SERPL-CCNC: 71 IU/L (ref 48–121)
ALT SERPL-CCNC: 34 IU/L (ref 0–32)
AST SERPL-CCNC: 26 IU/L (ref 0–40)
BASOPHILS # BLD AUTO: 0.1 X10E3/UL (ref 0–0.2)
BASOPHILS NFR BLD AUTO: 1 %
BILIRUB SERPL-MCNC: 0.7 MG/DL (ref 0–1.2)
BUN SERPL-MCNC: 16 MG/DL (ref 6–24)
BUN/CREAT SERPL: 24 (ref 9–23)
CALCIUM SERPL-MCNC: 9.4 MG/DL (ref 8.7–10.2)
CCP IGA+IGG SERPL IA-ACNC: 2 UNITS (ref 0–19)
CHLORIDE SERPL-SCNC: 108 MMOL/L (ref 96–106)
CO2 SERPL-SCNC: 21 MMOL/L (ref 20–29)
CREAT SERPL-MCNC: 0.67 MG/DL (ref 0.57–1)
EOSINOPHIL # BLD AUTO: 0.3 X10E3/UL (ref 0–0.4)
EOSINOPHIL NFR BLD AUTO: 6 %
ERYTHROCYTE [DISTWIDTH] IN BLOOD BY AUTOMATED COUNT: 12.4 % (ref 11.7–15.4)
ERYTHROCYTE [SEDIMENTATION RATE] IN BLOOD BY WESTERGREN METHOD: 6 MM/HR (ref 0–40)
GLOBULIN SER CALC-MCNC: 2.5 G/DL (ref 1.5–4.5)
GLUCOSE SERPL-MCNC: 98 MG/DL (ref 65–99)
HCT VFR BLD AUTO: 46.4 % (ref 34–46.6)
HGB BLD-MCNC: 15.7 G/DL (ref 11.1–15.9)
IMM GRANULOCYTES # BLD AUTO: 0 X10E3/UL (ref 0–0.1)
IMM GRANULOCYTES NFR BLD AUTO: 0 %
LYMPHOCYTES # BLD AUTO: 1.4 X10E3/UL (ref 0.7–3.1)
LYMPHOCYTES NFR BLD AUTO: 27 %
MCH RBC QN AUTO: 30.7 PG (ref 26.6–33)
MCHC RBC AUTO-ENTMCNC: 33.8 G/DL (ref 31.5–35.7)
MCV RBC AUTO: 91 FL (ref 79–97)
MONOCYTES # BLD AUTO: 0.3 X10E3/UL (ref 0.1–0.9)
MONOCYTES NFR BLD AUTO: 7 %
NEUTROPHILS # BLD AUTO: 3.1 X10E3/UL (ref 1.4–7)
NEUTROPHILS NFR BLD AUTO: 59 %
PLATELET # BLD AUTO: 228 X10E3/UL (ref 150–450)
POTASSIUM SERPL-SCNC: 4.5 MMOL/L (ref 3.5–5.2)
PROT SERPL-MCNC: 7.2 G/DL (ref 6–8.5)
RBC # BLD AUTO: 5.11 X10E6/UL (ref 3.77–5.28)
RHEUMATOID FACT SERPL-ACNC: <10 IU/ML (ref 0–13.9)
SODIUM SERPL-SCNC: 143 MMOL/L (ref 134–144)
TSH SERPL DL<=0.005 MIU/L-ACNC: 1.62 UIU/ML (ref 0.45–4.5)
WBC # BLD AUTO: 5.3 X10E3/UL (ref 3.4–10.8)

## 2021-06-23 ENCOUNTER — OFFICE VISIT (OUTPATIENT)
Dept: INTERNAL MEDICINE CLINIC | Age: 56
End: 2021-06-23
Payer: COMMERCIAL

## 2021-06-23 VITALS
OXYGEN SATURATION: 96 % | WEIGHT: 185 LBS | HEART RATE: 83 BPM | RESPIRATION RATE: 16 BRPM | HEIGHT: 63 IN | BODY MASS INDEX: 32.78 KG/M2 | SYSTOLIC BLOOD PRESSURE: 123 MMHG | DIASTOLIC BLOOD PRESSURE: 84 MMHG | TEMPERATURE: 98.3 F

## 2021-06-23 DIAGNOSIS — M79.10 MYALGIA: ICD-10-CM

## 2021-06-23 DIAGNOSIS — F32.1 DEPRESSION, MAJOR, SINGLE EPISODE, MODERATE (HCC): ICD-10-CM

## 2021-06-23 DIAGNOSIS — Z02.71 DISABILITY EXAMINATION: ICD-10-CM

## 2021-06-23 DIAGNOSIS — U07.1 COVID-19: Primary | Chronic | ICD-10-CM

## 2021-06-23 PROCEDURE — 99214 OFFICE O/P EST MOD 30 MIN: CPT | Performed by: FAMILY MEDICINE

## 2021-06-23 NOTE — PROGRESS NOTES
Chief Complaint   Patient presents with    Fatigue     back to work notice     Patient has not been out of the country in (14 months), NO diarrhea, NO cough, NO chest conjestion, NO temp. Pt has not been around anyone with these symptoms. Health Maintenance reviewed. I have reviewed the patient's medical history in detail and updated the computerized patient record. 1. Have you been to the ER, urgent care clinic since your last visit? no  Hospitalized since your last visit?  no    2. Have you seen or consulted any other health care providers outside of the [address] [place] [address] since your last visit? No Include any pap smears or colon screening. Encouraged pt to discuss pt's wishes with spouse/partner/family and bring them in the next appt to follow thru with the Advanced Directive    @  [address], last 12 mths 5/28/2021   Able to walk? Yes   Fall in past 12 months? 1   Do you feel unsteady? -   Are you worried about falling 1   Is the gait abnormal? 1   Number of falls in past 12 months 2   Fall with injury?  1       3 most recent PHQ Screens 6/23/2021   Little interest or pleasure in doing things Several days   Feeling down, depressed, irritable, or hopeless Several days   Total Score PHQ 2 2   Trouble falling or staying asleep, or sleeping too much -   Feeling tired or having little energy -   Poor appetite, weight loss, or overeating -   Feeling bad about yourself - or that you are a failure or have let yourself or your family down -   Trouble concentrating on things such as school, work, reading, or watching TV -   Moving or speaking so slowly that other people could have noticed; or the opposite being so fidgety that others notice -   Thoughts of being better off dead, or hurting yourself in some way -   PHQ 9 Score -   How difficult have these problems made it for you to do your work, take care of your home and get along with others -       Abuse Screening Questionnaire 6/23/2021   Do you ever feel afraid of your partner? N   Are you in a relationship with someone who physically or mentally threatens you? N   Is it safe for you to go home?  Y       ADL Assessment 6/23/2021   Feeding yourself No Help Needed   Getting from bed to chair No Help Needed   Getting dressed No Help Needed   Bathing or showering No Help Needed   Walk across the room (includes cane/walker) No Help Needed   Using the telphone No Help Needed   Taking your medications No Help Needed   Preparing meals No Help Needed   Managing money (expenses/bills) No Help Needed   Moderately strenuous housework (laundry) No Help Needed   Shopping for personal items (toiletries/medicines) No Help Needed   Shopping for groceries No Help Needed   Driving No Help Needed   Climbing a flight of stairs No Help Needed   Getting to places beyond walking distances No Help Needed

## 2021-06-23 NOTE — PROGRESS NOTES
Subjective:     Chief Complaint   Patient presents with    Fatigue     back to work notice        She  is a 60y.o. year old female who presents for evaluation of post covid Sx. Wants to return to work not sure if she can still very weak with myalgias    ROS:  Reports taking medications without side affects. No complaints of exertional chest pain, excessive shortness of breath or focal weakness. Minimal swelling in lower legs or dizziness with standing. Current Outpatient Medications   Medication Sig Dispense Refill    sertraline (ZOLOFT) 25 mg tablet Take 1 Tab by mouth daily. 30 Tab 5    loratadine (CLARITIN) 10 mg tablet Take 2 Tabs by mouth nightly. 60 Tab 5    fluticasone propionate (FLONASE) 50 mcg/actuation nasal spray Use 2 spray(s) in each nostril once daily 16 g 5    betamethasone dipropionate (DIPROSONE) 0.05 % topical cream Apply  to affected area two (2) times a day. 15 g 1    albuterol (PROVENTIL HFA, VENTOLIN HFA, PROAIR HFA) 90 mcg/actuation inhaler Take 1 Puff by inhalation every six (6) hours as needed for Wheezing. 1 Inhaler 1    hydrocortisone (HYTONE) 2.5 % topical cream Apply  to affected area two (2) times a day. use thin layer 30 g 1    OTHER,NON-FORMULARY, 1 Cap daily.  [facility] herbals        Allergies   Allergen Reactions    Codeine Itching    Hydrochlorothiazide Myalgia      Social History     Socioeconomic History    Marital status:      Spouse name: Not on file    Number of children: 2    Years of education: Not on file    Highest education level: Not on file   Occupational History    Occupation:      Employer: [employer]   Tobacco Use    Smoking status: Never Smoker    Smokeless tobacco: Never Used   Substance and Sexual Activity    Alcohol use: No     Alcohol/week: 0.0 standard drinks    Drug use: No    Sexual activity: Not Currently   Social History Narrative     2 kids     Social Determinants of Health     Financial Resource Strain:     Difficulty of Paying Living Expenses:    Food Insecurity:     Worried About Running Out of Food in the Last Year:     [address] in the Last Year:    Transportation Needs:     Lack of Transportation (Medical):  Lack of Transportation (Non-Medical):    Physical Activity:     Days of Exercise per Week:     Minutes of Exercise per Session:    Stress:     Feeling of Stress :    Social Connections:     Frequency of Communication with Friends and Family:     Frequency of Social Gatherings with Friends and Family:     Attends Yarsani Services:     Active Member of Clubs or Organizations:     Attends Club or Organization Meetings:     Marital Status:       Family History   Problem Relation Age of Onset    Diabetes Father       Past Surgical History:   Procedure Laterality Date    HX CHOLECYSTECTOMY      HX DILATION AND CURETTAGE      x2    HX GYN      fibroid tumor removal    HX PARTIAL HYSTERECTOMY  2011    fibroids      Past Medical History:   Diagnosis Date    Essential hypertension 8/10/2017    Psychiatric disorder     anxiety past hx            Objective:     Physical Examination:  Visit Vitals  /84 (BP 1 Location: Left arm, BP Patient Position: Sitting, BP Cuff Size: Adult)   Pulse 83   Temp 98.3 °F (36.8 °C) (Oral)   Resp 16   Ht 5' 2.5\" (1.588 m)   Wt 185 lb (83.9 kg)   LMP 09/01/2011   SpO2 96%   BMI 33.30 kg/m²   -    - General: pleasant, no distress, good eye contact  - Mental status:  Normal mood, behavior, speech, dress, motor activity and thought processes  - Cardiovascular: regular, normal rate, normal S1 and S2, no murmurs/rubs/gallops   - Respiratory: clear to auscultation bilaterally  - Psychiatric: normal mood and affect  -       Labs/Procedures:    No results found for any visits on 06/23/21. Assessment/ Plan:       ICD-10-CM ICD-9-CM    1. FRSTN-77  U07.1 079.89    2. Depression, major, single episode, moderate (HCC)  F32.1 296.22    3.  Disability examination  Z02.71 V68.01    4. Myalgia  M79.10 729.1      Filled out PW. I have discussed the diagnosis with the patient and the intended plan as seen in the above orders. The patient has received an after-visit summary and questions were answered concerning future plans. I have discussed medication side effects and warnings with the patient as well. Follow-up and Dispositions    · Return in about 4 weeks (around 7/21/2021) for routine follow up.

## 2021-06-24 ENCOUNTER — DOCUMENTATION ONLY (OUTPATIENT)
Dept: INTERNAL MEDICINE CLINIC | Age: 56
End: 2021-06-24

## 2021-06-24 NOTE — PROGRESS NOTES
Faxed to [facility] [phone] with confirmation pt's documentation for medical inquiry form in response to an accommodation reuest.

## 2021-06-30 ENCOUNTER — TELEPHONE (OUTPATIENT)
Dept: INTERNAL MEDICINE CLINIC | Age: 56
End: 2021-06-30

## 2021-06-30 NOTE — TELEPHONE ENCOUNTER
Patients short term papers have her job thinking she can only work part time and is now making her move to another branch further away to work. She needs it to say she can start full time starting this Monday July 5th. Thankyou!! And to release her to work on Monday! Please call patient to advise [phone]    Work fax# [phone]; [facility] [employer] --  Please forward to [facility]!

## 2021-06-30 NOTE — LETTER
NOTIFICATION RETURN TO WORK / SCHOOL    6/30/2021 4:23 PM    Ms. Raenelle Prader  [address] [address]         To Whom It May Concern:    Raenelle Prader is currently under the care of [address]. She will return to work on: Monday, July 5, 2021 with no restrictions/Full time. If there are questions or concerns please have the patient contact our office.         Sincerely,      Adele Cristobal MD

## 2021-06-30 NOTE — TELEPHONE ENCOUNTER
Called patient letter done as per patient. Document has been Faxed to [phone] without confirmation 4 times. Will keep trying.

## 2021-07-01 NOTE — TELEPHONE ENCOUNTER
Refaxed return to work notice 4 more time without success  [phone]    Called patient to inform and will have notice at  for pt to

## 2021-08-16 ENCOUNTER — OFFICE VISIT (OUTPATIENT)
Dept: INTERNAL MEDICINE CLINIC | Age: 56
End: 2021-08-16
Payer: COMMERCIAL

## 2021-08-16 VITALS
BODY MASS INDEX: 32.6 KG/M2 | DIASTOLIC BLOOD PRESSURE: 79 MMHG | TEMPERATURE: 98.4 F | SYSTOLIC BLOOD PRESSURE: 138 MMHG | OXYGEN SATURATION: 97 % | WEIGHT: 184 LBS | HEART RATE: 84 BPM | HEIGHT: 63 IN | RESPIRATION RATE: 16 BRPM

## 2021-08-16 DIAGNOSIS — L03.811 CELLULITIS AND ABSCESS OF HEAD: ICD-10-CM

## 2021-08-16 DIAGNOSIS — U07.1 COVID-19: Primary | Chronic | ICD-10-CM

## 2021-08-16 DIAGNOSIS — T50.Z95S ADVERSE EFFECT OF VACCINE, SEQUELA: ICD-10-CM

## 2021-08-16 DIAGNOSIS — T63.461A WASP STING, ACCIDENTAL OR UNINTENTIONAL, INITIAL ENCOUNTER: ICD-10-CM

## 2021-08-16 DIAGNOSIS — L02.811 CELLULITIS AND ABSCESS OF HEAD: ICD-10-CM

## 2021-08-16 DIAGNOSIS — U09.9 COVID-19 LONG HAULER: ICD-10-CM

## 2021-08-16 DIAGNOSIS — L30.9 ECZEMA, UNSPECIFIED TYPE: ICD-10-CM

## 2021-08-16 DIAGNOSIS — L30.1 DYSHYDROSIS: ICD-10-CM

## 2021-08-16 PROCEDURE — 99214 OFFICE O/P EST MOD 30 MIN: CPT | Performed by: FAMILY MEDICINE

## 2021-08-16 RX ORDER — CEPHALEXIN 500 MG/1
500 CAPSULE ORAL 3 TIMES DAILY
Qty: 21 CAPSULE | Refills: 0 | Status: SHIPPED | OUTPATIENT
Start: 2021-08-16 | End: 2021-08-23

## 2021-08-16 RX ORDER — TRIAMCINOLONE ACETONIDE 1 MG/G
CREAM TOPICAL 2 TIMES DAILY
Qty: 30 G | Refills: 1 | Status: SHIPPED | OUTPATIENT
Start: 2021-08-16 | End: 2022-02-24

## 2021-08-16 RX ORDER — PREDNISONE 20 MG/1
40 TABLET ORAL
Qty: 10 TABLET | Refills: 0 | Status: SHIPPED | OUTPATIENT
Start: 2021-08-16 | End: 2021-08-21

## 2021-08-16 NOTE — PROGRESS NOTES
Subjective:     Chief Complaint   Patient presents with    Ear Pain     R/ear red, painful and draining        She  is a 60y.o. year old female who presents for evaluation of covid long  Hauler Sx still with lots of aches and changes in taste sensation jose after vaccinated. Back to work but it's difficult, can't exercise. Core muscles have atrophied with her illness and she cannot ride her horses like usual.  Agree with above been scratching at her ear. Got Covid vaccination, shortly after that also got stung by wasps caused whelps which are slowly improving. ROS:  foer arm bone pain wrist pains, no fever. No cough discofort behind ears rib pain if exercies. Allergies   Allergen Reactions    Codeine Itching    Hydrochlorothiazide Myalgia      Social History     Socioeconomic History    Marital status:      Spouse name: Not on file    Number of children: 2    Years of education: Not on file    Highest education level: Not on file   Occupational History    Occupation:      Employer: [employer]   Tobacco Use    Smoking status: Never Smoker    Smokeless tobacco: Never Used   Substance and Sexual Activity    Alcohol use: No     Alcohol/week: 0.0 standard drinks    Drug use: No    Sexual activity: Not Currently   Social History Narrative     2 kids     Social Determinants of Health     Financial Resource Strain:     Difficulty of Paying Living Expenses:    Food Insecurity:     Worried About Running Out of Food in the Last Year:     Ran Out of Food in the Last Year:    Transportation Needs:     Lack of Transportation (Medical):      Lack of Transportation (Non-Medical):    Physical Activity:     Days of Exercise per Week:     Minutes of Exercise per Session:    Stress:     Feeling of Stress :    Social Connections:     Frequency of Communication with Friends and Family:     Frequency of Social Gatherings with Friends and Family:     Attends Oriental orthodox Services:     Active Member of Clubs or Organizations:     Attends Club or Organization Meetings:     Marital Status:       Family History   Problem Relation Age of Onset    Diabetes Father       Past Surgical History:   Procedure Laterality Date    HX CHOLECYSTECTOMY      HX DILATION AND CURETTAGE      x2    HX GYN      fibroid tumor removal    HX PARTIAL HYSTERECTOMY  2011    fibroids      Past Medical History:   Diagnosis Date    Essential hypertension 8/10/2017    Psychiatric disorder     anxiety past hx            Objective:     Physical Examination:  Visit Vitals  /79 (BP 1 Location: Left arm, BP Patient Position: Sitting, BP Cuff Size: Adult)   Pulse 84   Temp 98.4 °F (36.9 °C) (Temporal)   Resp 16   Ht 5' 2.5\" (1.588 m)   Wt 184 lb (83.5 kg)   LMP 09/01/2011   SpO2 97%   BMI 33.12 kg/m²   -    - General: pleasant, no distress, good eye contact  - Mental status:  Normal mood, behavior, speech, dress, motor activity and thought processes  - Psychiatric: normal mood and affect  - Tm cl behind ear ered sl swollen with laceration? scratch  Whelps seen on her photos on her phone the seem to have mainly faded in person by now    Labs/Procedures:    No results found for any visits on 08/16/21. Assessment/ Plan:       ICD-10-CM ICD-9-CM    1. SZTHS-57  U07.1 079.89    2. Wasp sting, accidental or unintentional, initial encounter  T63.461A 989.5      E905.3    3. Cellulitis and abscess of head  L03.811 682.8 cephALEXin (KEFLEX) 500 mg capsule    L02.811     4. Adverse effect of vaccine, sequela  T50. Z95S 909.5      E949.9    5. Dyshydrosis  L30.1 705.81    6. Eczema, unspecified type  L30.9 692.9 predniSONE (DELTASONE) 20 mg tablet      triamcinolone acetonide (KENALOG) 0.1 % topical cream   7. COVID-19 long hauler  B94.8 139.8      Orders Placed This Encounter    cephALEXin (KEFLEX) 500 mg capsule     Sig: Take 1 Capsule by mouth three (3) times daily for 7 days.      Dispense:  21 Capsule Refill:  0    predniSONE (DELTASONE) 20 mg tablet     Sig: Take 40 mg by mouth daily (with breakfast) for 5 days. Dispense:  10 Tablet     Refill:  0    triamcinolone acetonide (KENALOG) 0.1 % topical cream     Sig: Apply  to affected area two (2) times a day. use thin layer     Dispense:  30 g     Refill:  1       I have discussed the diagnosis with the patient and the intended plan as seen in the above orders. The patient has received an after-visit summary and questions were answered concerning future plans. I have discussed medication side effects and warnings with the patient as well. Follow-up and Dispositions    · Return in about 6 weeks (around 9/27/2021) for routine follow up.

## 2021-08-16 NOTE — PROGRESS NOTES
Chief Complaint   Patient presents with    Ear Pain     R/ear red, painful and draining     Patient has not been out of the country in (14 months), NO diarrhea, NO cough, NO chest conjestion, NO temp. Pt has not been around anyone with these symptoms. Health Maintenance reviewed. I have reviewed the patient's medical history in detail and updated the computerized patient record. 1. Have you been to the ER, urgent care clinic since your last visit? no  Hospitalized since your last visit?  no    2. Have you seen or consulted any other health care providers outside of the [address] [place] [address] since your last visit? No  Include any pap smears or colon screening. Encouraged pt to discuss pt's wishes with spouse/partner/family and bring them in the next appt to follow thru with the Advanced Directive    @  [address] [facility] [address], last 12 mths 5/28/2021   Able to walk? Yes   Fall in past 12 months? 1   Do you feel unsteady? -   Are you worried about falling 1   Is the gait abnormal? 1   Number of falls in past 12 months 2   Fall with injury?  1       3 most recent PHQ Screens 8/16/2021   Little interest or pleasure in doing things Several days   Feeling down, depressed, irritable, or hopeless Several days   Total Score PHQ 2 2   Trouble falling or staying asleep, or sleeping too much -   Feeling tired or having little energy -   Poor appetite, weight loss, or overeating -   Feeling bad about yourself - or that you are a failure or have let yourself or your family down -   Trouble concentrating on things such as school, work, reading, or watching TV -   Moving or speaking so slowly that other people could have noticed; or the opposite being so fidgety that others notice -   Thoughts of being better off dead, or hurting yourself in some way -   PHQ 9 Score -   How difficult have these problems made it for you to do your work, take care of your home and get along with others -       Abuse Screening Questionnaire 8/16/2021   Do you ever feel afraid of your partner? N   Are you in a relationship with someone who physically or mentally threatens you? -   Is it safe for you to go home?  Y       ADL Assessment 8/16/2021   Feeding yourself No Help Needed   Getting from bed to chair No Help Needed   Getting dressed No Help Needed   Bathing or showering No Help Needed   Walk across the room (includes cane/walker) No Help Needed   Using the telphone No Help Needed   Taking your medications No Help Needed   Preparing meals No Help Needed   Managing money (expenses/bills) No Help Needed   Moderately strenuous housework (laundry) No Help Needed   Shopping for personal items (toiletries/medicines) No Help Needed   Shopping for groceries No Help Needed   Driving No Help Needed   Climbing a flight of stairs No Help Needed   Getting to places beyond walking distances No Help Needed

## 2021-09-09 DIAGNOSIS — R06.02 SHORTNESS OF BREATH: ICD-10-CM

## 2022-02-24 DIAGNOSIS — L30.9 ECZEMA, UNSPECIFIED TYPE: ICD-10-CM

## 2022-02-24 RX ORDER — TRIAMCINOLONE ACETONIDE 1 MG/G
CREAM TOPICAL
Qty: 30 G | Refills: 0 | Status: SHIPPED | OUTPATIENT
Start: 2022-02-24 | End: 2022-07-25 | Stop reason: ALTCHOICE

## 2022-03-18 PROBLEM — L65.9 ALOPECIA: Status: ACTIVE | Noted: 2021-04-21

## 2022-03-18 PROBLEM — F32.1 DEPRESSION, MAJOR, SINGLE EPISODE, MODERATE (HCC): Status: ACTIVE | Noted: 2021-02-26

## 2022-03-19 PROBLEM — U07.1 COVID-19: Status: ACTIVE | Noted: 2021-03-21

## 2022-07-25 ENCOUNTER — VIRTUAL VISIT (OUTPATIENT)
Dept: INTERNAL MEDICINE CLINIC | Age: 57
End: 2022-07-25
Payer: COMMERCIAL

## 2022-07-25 ENCOUNTER — NURSE TRIAGE (OUTPATIENT)
Dept: OTHER | Facility: CLINIC | Age: 57
End: 2022-07-25

## 2022-07-25 DIAGNOSIS — F32.1 EPISODE OF MODERATE MAJOR DEPRESSION (HCC): ICD-10-CM

## 2022-07-25 DIAGNOSIS — L01.00 IMPETIGO ANY SITE: Primary | ICD-10-CM

## 2022-07-25 DIAGNOSIS — L24.5 IRRITANT CONTACT DERMATITIS DUE TO OTHER CHEMICAL PRODUCTS: ICD-10-CM

## 2022-07-25 DIAGNOSIS — F32.1 DEPRESSION, MAJOR, SINGLE EPISODE, MODERATE (HCC): ICD-10-CM

## 2022-07-25 DIAGNOSIS — Z12.11 SCREENING FOR COLON CANCER: ICD-10-CM

## 2022-07-25 PROCEDURE — 99214 OFFICE O/P EST MOD 30 MIN: CPT | Performed by: FAMILY MEDICINE

## 2022-07-25 RX ORDER — SERTRALINE HYDROCHLORIDE 25 MG/1
25 TABLET, FILM COATED ORAL DAILY
Qty: 90 TABLET | Refills: 1 | Status: SHIPPED | OUTPATIENT
Start: 2022-07-25 | End: 2022-10-11 | Stop reason: SDUPTHER

## 2022-07-25 RX ORDER — CEPHALEXIN 500 MG/1
500 CAPSULE ORAL 3 TIMES DAILY
Qty: 21 CAPSULE | Refills: 0 | Status: SHIPPED | OUTPATIENT
Start: 2022-07-25 | End: 2022-08-01

## 2022-07-25 RX ORDER — HYDROCORTISONE 25 MG/G
CREAM TOPICAL 2 TIMES DAILY
Qty: 30 G | Refills: 0 | Status: SHIPPED | OUTPATIENT
Start: 2022-07-25

## 2022-07-25 NOTE — PROGRESS NOTES
HISTORY OF PRESENT ILLNESS  Jose Helm is a 60 y.o. female. Insect Bite  The history is provided by the Patient. This is a new problem. The current episode started more than 2 days ago. The problem occurs hourly. The problem has been gradually worsening. Pertinent negatives include no chest pain and no shortness of breath. Associated symptoms comments: Facial rash. Exacerbated by: hair dye 3 weeks ago. Treatments tried: HC OTC. Tried multiple products which have not really helped that much. Review of Systems   Constitutional:  Negative for fever. HENT:  Negative for sore throat. Respiratory:  Negative for shortness of breath. Cardiovascular:  Negative for chest pain. Skin:  Positive for itching and rash. Psychiatric/Behavioral:  Negative for depression. Social History     Socioeconomic History    Marital status:      Spouse name: Not on file    Number of children: 2    Years of education: Not on file    Highest education level: Not on file   Occupational History    Occupation:      Employer: [employer]   Tobacco Use    Smoking status: Never    Smokeless tobacco: Never   Substance and Sexual Activity    Alcohol use: No     Alcohol/week: 0.0 standard drinks    Drug use: No    Sexual activity: Not Currently   Other Topics Concern    Not on file   Social History Narrative     2 kids     Social Determinants of Health     Financial Resource Strain: Not on file   Food Insecurity: Not on file   Transportation Needs: Not on file   Physical Activity: Not on file   Stress: Not on file   Social Connections: Not on file   Intimate Partner Violence: Not on file   Housing Stability: Not on file       Physical Exam  Appears to be in no acute distress, grossly 2 through 12 are nonfocal.  Some facial swelling and red patches  ASSESSMENT and PLAN  Encounter Diagnoses   Name Primary?     Impetigo any site Yes    Depression, major, single episode, moderate ([address]     Episode of moderate major depression ([facility]     Irritant contact dermatitis due to other chemical products     Screening for colon cancer      Orders Placed This Encounter    OCCULT BLOOD IMMUNOASSAY,DIAGNOSTIC    cephALEXin (KEFLEX) 500 mg capsule    hydrocortisone (HYTONE) 2.5 % topical cream    sertraline (ZOLOFT) 25 mg tablet     Follow-up 2 months or as needed

## 2022-07-25 NOTE — TELEPHONE ENCOUNTER
Received call from [employer] at [facility] with [employer] Complaint. Subjective: Caller states \"bug bites on face are swelling\"     Current Symptoms: Bug bites (mosquito) on face are draining clear fluid and swollen. Skin is peeling. Biggest bite on left cheek  Along eyebrows and chin line, posterior head. Pt reports many allergies and states she recently tried to color her hair-caused histamine reaction    Onset: 3-4 days, worsening    Associated Symptoms:  Normal ADLs, some interruptions in sleep    Pain Severity: 4/10    Temperature: Denies    What has been tried: Claritin, anti-itch cream helps stop drainage. LMP: NA Pregnant: NA, partial hysterectomy     Recommended disposition: See in Office Today    Care advice provided, patient verbalizes understanding; denies any other questions or concerns; instructed to call back for any new or worsening symptoms. James Triplett at [facility] calling patient to schedule    Attention Provider: Thank you for allowing me to participate in the care of your patient. The patient was connected to triage in response to information provided to the ECC. Please do not respond through this encounter as the response is not directed to a shared pool. Reason for Disposition   Red or very tender (to touch) area, getting larger over 48 hours after the bite    Protocols used:  Insect Bite-ADULT-[place]

## 2022-07-25 NOTE — PROGRESS NOTES
Anita Pritchett, who was evaluated through a synchronous (real-time) audio-video encounter, and/or her healthcare decision maker, is aware that it is a billable service, which includes applicable co-pays, with coverage as determined by her insurance carrier. She provided verbal consent to proceed and patient identification was verified. This visit was conducted pursuant to the emergency declaration under the [address] [place] [address] and the [employer] General Act. A caregiver was present when appropriate. Ability to conduct physical exam was limited. The patient was located at: Home: [facility] [id number]  The provider was located at:  Facility [facility] [address] Breanne Kwan MD on 7/25/2022 at 5:04 PM

## 2022-07-25 NOTE — PROGRESS NOTES
Chief Complaint   Patient presents with    Insect Bite     Patient is aware that this is a Virtual Visit or Phone Call Only doctor's visit. Patient has not been out of the country in (14 months), NO diarrhea, NO cough, NO chest conjestion, NO temp. Pt has not been around anyone with these symptoms. Health Maintenance reviewed. I have reviewed the patient's medical history in detail and updated the computerized patient record. Have you been to the ER, urgent care clinic since your last visit? No Hospitalized since your last visit?  no    2. Have you seen or consulted any other health care providers outside of the [address] [place] [address] since your last visit? No Include any pap smears or colon screening. Encouraged pt to discuss pt's wishes with spouse/partner/family and bring them in the next appt to follow thru with the Advanced Directive      Fall Risk Assessment, last 12 mths 5/28/2021   Able to walk? Yes   Fall in past 12 months? 1   Do you feel unsteady? -   Are you worried about falling 1   Is the gait abnormal? 1   Number of falls in past 12 months 2   Fall with injury?  1       3 most recent PHQ Screens 8/16/2021   Little interest or pleasure in doing things Several days   Feeling down, depressed, irritable, or hopeless Several days   Total Score PHQ 2 2   Trouble falling or staying asleep, or sleeping too much -   Feeling tired or having little energy -   Poor appetite, weight loss, or overeating -   Feeling bad about yourself - or that you are a failure or have let yourself or your family down -   Trouble concentrating on things such as school, work, reading, or watching TV -   Moving or speaking so slowly that other people could have noticed; or the opposite being so fidgety that others notice -   Thoughts of being better off dead, or hurting yourself in some way -   PHQ 9 Score -   How difficult have these problems made it for you to do your work, take care of your home and get along with others -       Abuse Screening Questionnaire 8/16/2021   Do you ever feel afraid of your partner? N   Are you in a relationship with someone who physically or mentally threatens you? -   Is it safe for you to go home?  Y       ADL Assessment 8/16/2021   Feeding yourself No Help Needed   Getting from bed to chair No Help Needed   Getting dressed No Help Needed   Bathing or showering No Help Needed   Walk across the room (includes cane/walker) No Help Needed   Using the telphone No Help Needed   Taking your medications No Help Needed   Preparing meals No Help Needed   Managing money (expenses/bills) No Help Needed   Moderately strenuous housework (laundry) No Help Needed   Shopping for personal items (toiletries/medicines) No Help Needed   Shopping for groceries No Help Needed   Driving No Help Needed   Climbing a flight of stairs No Help Needed   Getting to places beyond walking distances No Help Needed

## 2022-07-29 ENCOUNTER — OFFICE VISIT (OUTPATIENT)
Dept: INTERNAL MEDICINE CLINIC | Age: 57
End: 2022-07-29
Payer: COMMERCIAL

## 2022-07-29 VITALS
DIASTOLIC BLOOD PRESSURE: 77 MMHG | HEIGHT: 63 IN | WEIGHT: 201.6 LBS | TEMPERATURE: 97.6 F | HEART RATE: 78 BPM | OXYGEN SATURATION: 94 % | RESPIRATION RATE: 16 BRPM | SYSTOLIC BLOOD PRESSURE: 128 MMHG | BODY MASS INDEX: 35.72 KG/M2

## 2022-07-29 DIAGNOSIS — Z12.31 ENCOUNTER FOR SCREENING MAMMOGRAM FOR MALIGNANT NEOPLASM OF BREAST: ICD-10-CM

## 2022-07-29 DIAGNOSIS — S93.402A SPRAIN OF LEFT ANKLE, UNSPECIFIED LIGAMENT, INITIAL ENCOUNTER: ICD-10-CM

## 2022-07-29 DIAGNOSIS — F32.1 DEPRESSION, MAJOR, SINGLE EPISODE, MODERATE (HCC): Primary | ICD-10-CM

## 2022-07-29 DIAGNOSIS — L01.00 IMPETIGO: ICD-10-CM

## 2022-07-29 DIAGNOSIS — S99.922S FOOT INJURY, LEFT, SEQUELA: ICD-10-CM

## 2022-07-29 PROBLEM — L65.9 ALOPECIA: Status: RESOLVED | Noted: 2021-04-21 | Resolved: 2022-07-29

## 2022-07-29 PROCEDURE — 99214 OFFICE O/P EST MOD 30 MIN: CPT | Performed by: FAMILY MEDICINE

## 2022-07-29 NOTE — PROGRESS NOTES
PROGRESS NOTE        SUBJECTIVE:  Diagnosis/Chief Complaint: Insect Bite (Rash from insect bites to face - treated with antibiotic - very itchy)      Doing well with mood yes - sleeping better  Symptoms rash healing with AB  Suicidal: no  Side affects: no  States taking medications per medicine list.yes - as rx  Several injuries to left ankle foot. Patient Active Problem List    Diagnosis Date Noted    Alopecia 04/21/2021    COVID-19 03/21/2021    Depression, major, single episode, moderate ([facility] 02/26/2021    Eczema 08/19/2016    Allergic rhinitis 08/16/2016     Current Outpatient Medications   Medication Sig Dispense Refill    cephALEXin (KEFLEX) 500 mg capsule Take 1 Capsule by mouth three (3) times daily for 7 days. 21 Capsule 0    hydrocortisone (HYTONE) 2.5 % topical cream Apply  to affected area two (2) times a day. use thin layer 30 g 0    sertraline (ZOLOFT) 25 mg tablet Take 1 Tablet by mouth in the morning. 90 Tablet 1    Allergy Relief, loratadine, 10 mg tablet TAKE 2 TABLETS BY MOUTH NIGHTLY 60 Tablet 5    fluticasone propionate (FLONASE) 50 mcg/actuation nasal spray Use 2 spray(s) in each nostril once daily 16 g 5    albuterol (PROVENTIL HFA, VENTOLIN HFA, PROAIR HFA) 90 mcg/actuation inhaler Take 1 Puff by inhalation every six (6) hours as needed for Wheezing. 1 Inhaler 1    OTHER,NON-FORMULARY, 1 Cap daily.  Greens herbals        Allergies   Allergen Reactions    Codeine Itching    Hydrochlorothiazide Myalgia     Past Medical History:   Diagnosis Date    Essential hypertension 8/10/2017    Psychiatric disorder     anxiety past hx     Social History     Tobacco Use    Smoking status: Never    Smokeless tobacco: Never   Substance Use Topics    Alcohol use: No     Alcohol/week: 0.0 standard drinks        OBJECTIVE:    .Visit Vitals  /77 (BP 1 Location: Left arm, BP Patient Position: Sitting, BP Cuff Size: Large adult)   Pulse 78   Temp 97.6 °F (36.4 °C) (Oral)   Resp 16   Ht 5' 2.5\" (1.588 m) Wt 201 lb 9.6 oz (91.4 kg)   LMP 09/01/2011   SpO2 94%   BMI 36.29 kg/m²     WDWN in NAD    Neurological exam[de-identified] 2-12 intact  Psychiatric: Normal mood, judgement    Reviewed: Medications, allergies, clinical lab test results and imaging results have been reviewed. Any abnormal findings have been addressed. ASSESSMENT:       ICD-10-CM ICD-9-CM    1. Depression, major, single episode, moderate (HCC)  F32.1 296.22       2. Impetigo  L01.00 684       3. Sprain of left ankle, unspecified ligament, initial encounter  S93.402A 845.00 XR ANKLE LT MIN 3 V      AMB SUPPLY ORDER      4. Encounter for screening mammogram for malignant neoplasm of breast  Z12.31 V76.12 AGUST[facility]N MAMMOGRAM DIAG BILAT SAME DAY INCL CAD      5.  Foot injury, left, sequela  S99.922S 908.9 XR FOOT LT MIN 3 V          PLAN    Orders Placed This Encounter    AMB SUPPLY ORDER     Ankle sprain    XR ANKLE LT MIN 3 V     Standing Status:   Future     Standing Expiration Date:   8/29/2023     Order Specific Question:   Reason for Exam     Answer:   ankle pain    AGUST[facility]N MAMMOGRAM DIAG DIGITAL BILAT SAME DAY     Standing Status:   Future     Standing Expiration Date:   8/29/2023     Order Specific Question:   Reason for Exam     Answer:   screening breast cancer    XR FOOT LT MIN 3 V     Standing Status:   Future     Standing Expiration Date:   8/29/2023       3 mo f/up

## 2022-07-29 NOTE — PROGRESS NOTES
Chief Complaint   Patient presents with    Insect Bite     Rash from insect bites to face - treated with antibiotic - very itchy

## 2022-08-03 DIAGNOSIS — S99.922A INJURY, FOOT, LEFT, INITIAL ENCOUNTER: Primary | ICD-10-CM

## 2022-08-04 ENCOUNTER — TELEPHONE (OUTPATIENT)
Dept: INTERNAL MEDICINE CLINIC | Age: 57
End: 2022-08-04

## 2022-08-05 LAB — HEMOCCULT STL QL IA: NEGATIVE

## 2022-10-11 ENCOUNTER — OFFICE VISIT (OUTPATIENT)
Dept: INTERNAL MEDICINE CLINIC | Age: 57
End: 2022-10-11
Payer: COMMERCIAL

## 2022-10-11 VITALS
WEIGHT: 198 LBS | RESPIRATION RATE: 16 BRPM | BODY MASS INDEX: 35.08 KG/M2 | HEART RATE: 94 BPM | SYSTOLIC BLOOD PRESSURE: 127 MMHG | OXYGEN SATURATION: 96 % | TEMPERATURE: 98.5 F | HEIGHT: 63 IN | DIASTOLIC BLOOD PRESSURE: 75 MMHG

## 2022-10-11 DIAGNOSIS — S99.921A FOOT INJURY, RIGHT, INITIAL ENCOUNTER: Primary | ICD-10-CM

## 2022-10-11 DIAGNOSIS — E66.9 OBESITY, UNSPECIFIED OBESITY SEVERITY, UNSPECIFIED OBESITY TYPE: ICD-10-CM

## 2022-10-11 DIAGNOSIS — Z12.31 ENCOUNTER FOR SCREENING MAMMOGRAM FOR BREAST CANCER: ICD-10-CM

## 2022-10-11 DIAGNOSIS — J45.20 RAD (REACTIVE AIRWAY DISEASE), MILD INTERMITTENT, UNCOMPLICATED: ICD-10-CM

## 2022-10-11 DIAGNOSIS — F32.1 EPISODE OF MODERATE MAJOR DEPRESSION (HCC): ICD-10-CM

## 2022-10-11 DIAGNOSIS — L30.9 ECZEMA, UNSPECIFIED TYPE: ICD-10-CM

## 2022-10-11 PROCEDURE — 99214 OFFICE O/P EST MOD 30 MIN: CPT | Performed by: FAMILY MEDICINE

## 2022-10-11 RX ORDER — ALBUTEROL SULFATE 90 UG/1
1 AEROSOL, METERED RESPIRATORY (INHALATION)
Qty: 1 EACH | Refills: 1 | Status: SHIPPED | OUTPATIENT
Start: 2022-10-11

## 2022-10-11 RX ORDER — SERTRALINE HYDROCHLORIDE 25 MG/1
25 TABLET, FILM COATED ORAL DAILY
Qty: 90 TABLET | Refills: 1 | Status: SHIPPED | OUTPATIENT
Start: 2022-10-11

## 2022-10-11 NOTE — PROGRESS NOTES
HISTORY OF PRESENT ILLNESS  Lillian Chavira is a 59 y.o. female. Foot Pain  The history is provided by the Patient. This is a new problem. The current episode started more than 1 week ago. The problem occurs hourly. The problem has been gradually improving. Associated symptoms comments: Right foot pain dorsum of forefoot. She has tried rest (stubled off ramp and stepped wrong. [employer]) for the symptoms. The treatment provided mild relief. Review of Systems   Musculoskeletal:  Positive for myalgias. From covid   Psychiatric/Behavioral:  Negative for depression and substance abuse.     Patient Active Problem List   Diagnosis Code    Allergic rhinitis J30.9    Eczema L30.9    Depression, major, single episode, moderate ([facility] F32.1    COVID-19 U07.1     Social History     Socioeconomic History    Marital status:      Spouse name: Not on file    Number of children: 2    Years of education: Not on file    Highest education level: Not on file   Occupational History    Occupation:      Employer: [employer]   Tobacco Use    Smoking status: Never    Smokeless tobacco: Never   Substance and Sexual Activity    Alcohol use: No     Alcohol/week: 0.0 standard drinks    Drug use: No    Sexual activity: Not Currently   Other Topics Concern    Not on file   Social History Narrative     2 kids     Social Determinants of Health     Financial Resource Strain: Not on file   Food Insecurity: Not on file   Transportation Needs: Not on file   Physical Activity: Not on file   Stress: Not on file   Social Connections: Not on file   Intimate Partner Violence: Not on file   Housing Stability: Not on file         Physical Exam  Visit Vitals  /75 (BP 1 Location: Left arm, BP Patient Position: Sitting, BP Cuff Size: Adult)   Pulse 94   Temp 98.5 °F (36.9 °C) (Temporal)   Resp 16   Ht 5' 2.5\" (1.588 m)   Wt 198 lb (89.8 kg)   LMP 09/01/2011   SpO2 96%   BMI 35.64 kg/m²     Right  to palp forefoot  NV intact  ASSESSMENT and PLAN  Encounter Diagnoses   Name Primary?     Foot injury, right, initial encounter Yes    Episode of moderate major depression ([facility] [address] [address]     Obesity, unspecified obesity severity, unspecified obesity type     Encounter for screening mammogram for breast cancer     RAD (reactive airway disease), mild intermittent, uncomplicated     Eczema, unspecified type      Orders Placed This Encounter    XR FOOT RT MIN 3 V    sertraline (ZOLOFT) 25 mg tablet    albuterol (PROVENTIL HFA, VENTOLIN HFA, PROAIR HFA) 90 mcg/actuation inhaler   Further advice after x-rays return continue soft cast  3 to 4-month follow-up

## 2022-10-11 NOTE — PROGRESS NOTES
Chief Complaint   Patient presents with    Foot Pain     Patient has not been out of the country in (14 months), NO diarrhea, NO cough, NO chest conjestion, NO temp. Pt has not been around anyone with these symptoms. Health Maintenance reviewed. I have reviewed the patient's medical history in detail and updated the computerized patient record. 1. Have you been to the ER, urgent care clinic since your last visit? No  Hospitalized since your last visit?  no    2. Have you seen or consulted any other health care providers outside of the [address] [place] [address] since your last visit? No   Include any pap smears or colon screening. Encouraged pt to discuss pt's wishes with spouse/partner/family and bring them in the next appt to follow thru with the Advanced Directive    @  [address], last 12 mths 10/11/2022   Able to walk? Yes   Fall in past 12 months? 1   Do you feel unsteady? 1   Are you worried about falling 1   Is the gait abnormal? 1   Number of falls in past 12 months 2   Fall with injury?  1       3 most recent PHQ Screens 10/11/2022   Little interest or pleasure in doing things Several days   Feeling down, depressed, irritable, or hopeless Several days   Total Score PHQ 2 2   Trouble falling or staying asleep, or sleeping too much -   Feeling tired or having little energy -   Poor appetite, weight loss, or overeating -   Feeling bad about yourself - or that you are a failure or have let yourself or your family down -   Trouble concentrating on things such as school, work, reading, or watching TV -   Moving or speaking so slowly that other people could have noticed; or the opposite being so fidgety that others notice -   Thoughts of being better off dead, or hurting yourself in some way -   PHQ 9 Score -   How difficult have these problems made it for you to do your work, take care of your home and get along with others -       Abuse Screening Questionnaire 10/11/2022   Do you ever feel afraid of your partner? N   Are you in a relationship with someone who physically or mentally threatens you? N   Is it safe for you to go home?  Y       ADL Assessment 10/11/2022   Feeding yourself No Help Needed   Getting from bed to chair No Help Needed   Getting dressed No Help Needed   Bathing or showering No Help Needed   Walk across the room (includes cane/walker) No Help Needed   Using the telphone No Help Needed   Taking your medications No Help Needed   Preparing meals No Help Needed   Managing money (expenses/bills) No Help Needed   Moderately strenuous housework (laundry) No Help Needed   Shopping for personal items (toiletries/medicines) No Help Needed   Shopping for groceries No Help Needed   Driving No Help Needed   Climbing a flight of stairs No Help Needed   Getting to places beyond walking distances No Help Needed

## 2022-10-14 ENCOUNTER — TELEPHONE (OUTPATIENT)
Dept: INTERNAL MEDICINE CLINIC | Age: 57
End: 2022-10-14

## 2022-10-28 DIAGNOSIS — S99.921A FOOT INJURY, RIGHT, INITIAL ENCOUNTER: ICD-10-CM

## 2023-01-16 DIAGNOSIS — S93.402A SPRAIN OF LEFT ANKLE, UNSPECIFIED LIGAMENT, INITIAL ENCOUNTER: ICD-10-CM

## 2023-06-19 ENCOUNTER — OFFICE VISIT (OUTPATIENT)
Facility: CLINIC | Age: 58
End: 2023-06-19
Payer: COMMERCIAL

## 2023-06-19 VITALS
HEART RATE: 77 BPM | RESPIRATION RATE: 20 BRPM | HEIGHT: 63 IN | OXYGEN SATURATION: 92 % | SYSTOLIC BLOOD PRESSURE: 129 MMHG | TEMPERATURE: 97.5 F | BODY MASS INDEX: 36.14 KG/M2 | DIASTOLIC BLOOD PRESSURE: 80 MMHG | WEIGHT: 204 LBS

## 2023-06-19 DIAGNOSIS — M79.672 LEFT FOOT PAIN: Primary | ICD-10-CM

## 2023-06-19 PROCEDURE — 99213 OFFICE O/P EST LOW 20 MIN: CPT | Performed by: NURSE PRACTITIONER

## 2023-07-04 ASSESSMENT — ENCOUNTER SYMPTOMS
ALLERGIC/IMMUNOLOGIC NEGATIVE: 1
EYES NEGATIVE: 1
RESPIRATORY NEGATIVE: 1
GASTROINTESTINAL NEGATIVE: 1

## 2023-07-04 NOTE — PROGRESS NOTES
Meron Pa (: 1965) is a 59 y.o. female is here for evaluation of the following chief complaint(s): Foot Injury (Bilateral foot pain - frequent falls)        Subjective/Objective:   Kelle Christensen was seen today for Foot Injury (Bilateral foot pain - frequent falls)  Pt states having jose foot pain but left is worse and new. Pt states this is causing her to fall. Pt denies numbness tingling redness swelling. Pt states is painful to bear weight. Pt denies hx of surgeries to feet. Review of Systems   Constitutional: Negative. HENT: Negative. Eyes: Negative. Respiratory: Negative. Cardiovascular: Negative. Gastrointestinal: Negative. Endocrine: Negative. Genitourinary: Negative. Musculoskeletal:         Left foot pain   Skin: Negative. Allergic/Immunologic: Negative. Neurological: Negative. Hematological: Negative. Psychiatric/Behavioral: Negative. Physical Exam  Vitals reviewed. Constitutional:       General: She is not in acute distress. Appearance: Normal appearance. She is not ill-appearing or toxic-appearing. HENT:      Head: Normocephalic and atraumatic. Right Ear: External ear normal.      Left Ear: External ear normal.      Nose: Nose normal.      Mouth/Throat:      Mouth: Mucous membranes are moist.   Eyes:      Conjunctiva/sclera: Conjunctivae normal.   Cardiovascular:      Rate and Rhythm: Normal rate and regular rhythm. Pulses: Normal pulses. Heart sounds: Normal heart sounds. Pulmonary:      Effort: Pulmonary effort is normal.      Breath sounds: Normal breath sounds. Abdominal:      Palpations: Abdomen is soft. Musculoskeletal:         General: Tenderness (to lateral left foot) and signs of injury present. Normal range of motion. Cervical back: Normal range of motion. Feet:    Skin:     General: Skin is warm and dry. Capillary Refill: Capillary refill takes less than 2 seconds.    Neurological:

## 2023-07-07 ENCOUNTER — TELEPHONE (OUTPATIENT)
Facility: CLINIC | Age: 58
End: 2023-07-07

## 2023-08-09 ENCOUNTER — TELEPHONE (OUTPATIENT)
Facility: CLINIC | Age: 58
End: 2023-08-09

## 2023-08-09 NOTE — TELEPHONE ENCOUNTER
Please call patient about her xray of foot. She said she had it done, but when I looked in chart it says the order is ? ? Not sure what that means. Please call patient to advise. She said when she went to get it done, she had to go through ER entrance because it was after hours.     Thanks

## 2023-10-02 RX ORDER — SERTRALINE HYDROCHLORIDE 25 MG/1
25 TABLET, FILM COATED ORAL EVERY MORNING
Qty: 90 TABLET | Refills: 1 | Status: SHIPPED | OUTPATIENT
Start: 2023-10-02 | End: 2023-11-30 | Stop reason: SDUPTHER

## 2023-10-02 RX ORDER — TRIAMCINOLONE ACETONIDE 1 MG/G
CREAM TOPICAL
Qty: 30 G | Refills: 5 | Status: SHIPPED | OUTPATIENT
Start: 2023-10-02 | End: 2023-11-30 | Stop reason: SDUPTHER

## 2023-10-02 RX ORDER — FLUTICASONE PROPIONATE 50 MCG
SPRAY, SUSPENSION (ML) NASAL
Qty: 16 G | Refills: 5 | Status: SHIPPED | OUTPATIENT
Start: 2023-10-02

## 2023-11-27 ENCOUNTER — TELEPHONE (OUTPATIENT)
Facility: CLINIC | Age: 58
End: 2023-11-27

## 2023-11-27 NOTE — TELEPHONE ENCOUNTER
Pt says she sent a mychart msg to Dr Navarro on 11/21 and would like to know if he has seen it. She is still not feeling well and is seeing if Dr Navarro can call something in since he told her he would if she was not better by last week. Please call pt at [phone]

## 2023-11-30 ENCOUNTER — TELEMEDICINE (OUTPATIENT)
Facility: CLINIC | Age: 58
End: 2023-11-30
Payer: COMMERCIAL

## 2023-11-30 DIAGNOSIS — J45.20 MILD INTERMITTENT ASTHMA, UNCOMPLICATED: ICD-10-CM

## 2023-11-30 DIAGNOSIS — G47.33 OSA (OBSTRUCTIVE SLEEP APNEA): ICD-10-CM

## 2023-11-30 DIAGNOSIS — F32.1 MAJOR DEPRESSIVE DISORDER, SINGLE EPISODE, MODERATE (HCC): ICD-10-CM

## 2023-11-30 DIAGNOSIS — J01.00 ACUTE MAXILLARY SINUSITIS, RECURRENCE NOT SPECIFIED: Primary | ICD-10-CM

## 2023-11-30 DIAGNOSIS — H01.009: ICD-10-CM

## 2023-11-30 PROCEDURE — 99214 OFFICE O/P EST MOD 30 MIN: CPT | Performed by: FAMILY MEDICINE

## 2023-11-30 RX ORDER — TRIAMCINOLONE ACETONIDE 1 MG/G
CREAM TOPICAL 2 TIMES DAILY
Qty: 30 G | Refills: 5 | Status: SHIPPED | OUTPATIENT
Start: 2023-11-30

## 2023-11-30 RX ORDER — ALBUTEROL SULFATE 90 UG/1
1 AEROSOL, METERED RESPIRATORY (INHALATION) EVERY 6 HOURS PRN
Qty: 18 G | Refills: 5 | Status: SHIPPED | OUTPATIENT
Start: 2023-11-30

## 2023-11-30 RX ORDER — SERTRALINE HYDROCHLORIDE 25 MG/1
25 TABLET, FILM COATED ORAL EVERY MORNING
Qty: 90 TABLET | Refills: 3 | Status: SHIPPED | OUTPATIENT
Start: 2023-11-30

## 2023-11-30 RX ORDER — AMOXICILLIN 500 MG/1
500 CAPSULE ORAL 3 TIMES DAILY
Qty: 21 CAPSULE | Refills: 0 | Status: SHIPPED | OUTPATIENT
Start: 2023-11-30 | End: 2023-12-07

## 2023-11-30 ASSESSMENT — PATIENT HEALTH QUESTIONNAIRE - PHQ9
SUM OF ALL RESPONSES TO PHQ QUESTIONS 1-9: 2
SUM OF ALL RESPONSES TO PHQ QUESTIONS 1-9: 2
1. LITTLE INTEREST OR PLEASURE IN DOING THINGS: 1
2. FEELING DOWN, DEPRESSED OR HOPELESS: 1
SUM OF ALL RESPONSES TO PHQ9 QUESTIONS 1 & 2: 2
SUM OF ALL RESPONSES TO PHQ QUESTIONS 1-9: 2
SUM OF ALL RESPONSES TO PHQ QUESTIONS 1-9: 2

## 2023-11-30 NOTE — PROGRESS NOTES
Yuniel Stone, was evaluated through a synchronous (real-time) audio-video encounter. The patient (or guardian if applicable) is aware that this is a billable service, which includes applicable co-pays. This Virtual Visit was conducted with patient's (and/or legal guardian's) consent. Patient identification was verified, and a caregiver was present when appropriate. The patient was located at Other: work  Provider was located at [facility] ([facility]): [address] [place] [address]         Total time spent for this encounter: Not billed by time    --Jackie Christianson MD on 12/3/2023 at 9:10 AM    An electronic signature was used to authenticate this note. Subjective:   Yuniel Stone is a 59 y.o. female who complains of sore throat, post nasal drip, cough described as productive of green sputum, and grey nasal discharge for 2 days, stable since that time. She denies a history of shortness of breath and vomiting. Evaluation to date: none. Treatment to date: OTC products. Patient does not smoke cigarettes. Relevant PMH: No pertinent additional PMH. Allergies   Allergen Reactions    Codeine Itching    Hydrochlorothiazide Myalgia     Patient Active Problem List   Diagnosis    Eczema    Depression, major, single episode, moderate ([facility])    COVID-19    Allergic rhinitis         Patient Active Problem List    Diagnosis Date Noted    COVID-19 03/21/2021    Depression, major, single episode, moderate ([address]) 02/26/2021    Eczema 08/19/2016    Allergic rhinitis 08/16/2016     Allergies   Allergen Reactions    Codeine Itching    Hydrochlorothiazide Myalgia     Past Medical History:   Diagnosis Date    Essential hypertension 8/10/2017     Social History     Tobacco Use    Smoking status: Never    Smokeless tobacco: Never   Substance Use Topics    Alcohol use: No     Alcohol/week: 0.0 standard drinks of alcohol        Review of Systems  Pertinent items are noted in HPI.     Objective:       WD WN female NAD        Assessment/Plan:      Diagnosis

## 2023-12-04 ENCOUNTER — TELEPHONE (OUTPATIENT)
Age: 58
End: 2023-12-04

## 2024-02-21 ENCOUNTER — OFFICE VISIT (OUTPATIENT)
Age: 59
End: 2024-02-21
Payer: COMMERCIAL

## 2024-02-21 VITALS
DIASTOLIC BLOOD PRESSURE: 62 MMHG | SYSTOLIC BLOOD PRESSURE: 97 MMHG | WEIGHT: 206.5 LBS | TEMPERATURE: 98 F | HEIGHT: 63 IN | BODY MASS INDEX: 36.59 KG/M2 | OXYGEN SATURATION: 95 % | HEART RATE: 83 BPM

## 2024-02-21 DIAGNOSIS — G47.33 OSA (OBSTRUCTIVE SLEEP APNEA): Primary | ICD-10-CM

## 2024-02-21 DIAGNOSIS — F41.9 ANXIETY AND DEPRESSION: ICD-10-CM

## 2024-02-21 DIAGNOSIS — F32.A ANXIETY AND DEPRESSION: ICD-10-CM

## 2024-02-21 PROCEDURE — 99204 OFFICE O/P NEW MOD 45 MIN: CPT | Performed by: INTERNAL MEDICINE

## 2024-02-21 ASSESSMENT — SLEEP AND FATIGUE QUESTIONNAIRES
HOW LIKELY ARE YOU TO NOD OFF OR FALL ASLEEP WHILE SITTING QUIETLY AFTER LUNCH WITHOUT ALCOHOL: 0
HOW LIKELY ARE YOU TO NOD OFF OR FALL ASLEEP WHILE WATCHING TV: 1
HOW LIKELY ARE YOU TO NOD OFF OR FALL ASLEEP WHEN YOU ARE A PASSENGER IN A CAR FOR AN HOUR WITHOUT A BREAK: 0
ESS TOTAL SCORE: 4
NECK CIRCUMFERENCE (INCHES): 16
HOW LIKELY ARE YOU TO NOD OFF OR FALL ASLEEP WHILE SITTING AND TALKING TO SOMEONE: 0
HOW LIKELY ARE YOU TO NOD OFF OR FALL ASLEEP WHILE LYING DOWN TO REST IN THE AFTERNOON WHEN CIRCUMSTANCES PERMIT: 2
HOW LIKELY ARE YOU TO NOD OFF OR FALL ASLEEP WHILE SITTING INACTIVE IN A PUBLIC PLACE: 0
HOW LIKELY ARE YOU TO NOD OFF OR FALL ASLEEP WHILE SITTING AND READING: 1
HOW LIKELY ARE YOU TO NOD OFF OR FALL ASLEEP IN A CAR, WHILE STOPPED FOR A FEW MINUTES IN TRAFFIC: 0

## 2024-02-21 NOTE — PATIENT INSTRUCTIONS
spray.  When should you call for help?  Watch closely for changes in your health, and be sure to contact your doctor if:  You still have sleep apnea even though you have made lifestyle changes.  You are thinking of trying a device such as CPAP.  You are having problems using a CPAP or similar machine.                Where can you learn more?   Go to http://www.OneWhe[employer].net/Elizabethcours.  Enter J936 in the search box to learn more about \"Sleep Apnea: After Your Visit.\"   © 4167-1349 Healthwise, Inc[employer]. Care instructions adapted under license by [employer] (which disclaims liability or warranty for this information). This care instruction is for use with your licensed healthcare professional. If you have questions about a medical condition or this instruction, always ask your healthcare professional. [employer] disclaims any warranty or liability for your use of this information.      PROPER SLEEP HYGIENE    What to avoid  Do not have drinks with caffeine, such as coffee or black tea, for 8 hours before bed.  Do not smoke or use other types of tobacco near bedtime. Nicotine is a stimulant and can keep you awake.  Avoid drinking alcohol late in the evening, because it can cause you to wake in the middle of the night.  Do not eat a big meal close to bedtime. If you are hungry, eat a light snack.  Do not drink a lot of water close to bedtime, because the need to urinate may wake you up during the night.  Do not read or watch TV in bed. Use the bed only for sleeping and sexual activity.  What to try  Go to bed at the same time every night, and wake up at the same time every morning. Do not take naps during the day.  Keep your bedroom quiet, dark, and cool.  Get regular exercise, but not within 3 to 4 hours of your bedtime..  Sleep on a comfortable pillow and mattress.  If watching the clock makes you anxious, turn it facing away from you so you cannot see the time.  If you worry when you lie down, start a worry

## 2024-02-21 NOTE — PROGRESS NOTES
[address] [place], [place] [address]  Tel.  [phone]    Fax. [phone]     [address] [place] [address]  Tel.  [phone]    Fax. [phone] [address] [place] [address]  Tel.  [phone]    Fax. [phone]       Norma Kaye is a 58 y.o. year old female seen for evaluation of a sleep disorder.       ASSESSMENT/PLAN:     Diagnosis Orders   1. JOCELYNE (obstructive sleep apnea)  PAT - Home Sleep Test      2. Anxiety and depression        3. BMI 37.0-37.9, adult            Patient has a history and examination consistent with the diagnosis of sleep apnea.    Return for follow-up in 3 months or as needed.    * The patient currently has a Moderate Risk for having sleep apnea.  STOP-BANG score 4.    * Sleep testing was ordered for initial evaluation.      Orders Placed This Encounter   Procedures    PAT - Home Sleep Test     Standing Status:   Future     Standing Expiration Date:   8/21/2024     Order Specific Question:   Location For Sleep Study     Answer:   [facility]       * She was provided information on sleep apnea including corresponding risk factors and the importance of proper treatment.     * Treatment options were reviewed in detail. she would like to proceed with PAP therapy. Patient will be seen in follow-up in 6-8 weeks after PAP setup to gauge treatment response and adherence to therapy.     * The patient was counseled regarding proper sleep hygiene, with emphasis on ensuring sufficient total sleep time; safe driving and the benefits of exercise and weight loss.      * All of her questions were addressed.    2. Recommended a dedicated weight loss program through appropriate diet and exercise regimen as significant weight reduction has been shown to reduce severity of obstructive sleep apnea.     SUBJECTIVE/OBJECTIVE:    Norma Kaye is an 58 y.o. female referred

## 2024-02-22 ENCOUNTER — CLINICAL DOCUMENTATION (OUTPATIENT)
Age: 59
End: 2024-02-22

## 2024-03-07 ENCOUNTER — CLINICAL DOCUMENTATION (OUTPATIENT)
Age: 59
End: 2024-03-07

## 2024-03-30 ENCOUNTER — CLINICAL DOCUMENTATION (OUTPATIENT)
Age: 59
End: 2024-03-30

## 2024-03-30 DIAGNOSIS — G47.33 OSA (OBSTRUCTIVE SLEEP APNEA): Primary | ICD-10-CM

## 2024-03-30 NOTE — PROGRESS NOTES
Norma Kaye is to be contacted by lead sleep technologist regarding results of Sleep Testing performed on 24 & 24 and which was indicative of an average AHI of 43.3 / 60.2 per hour with SpO2 of < 90% being 19.61 / 28.73 percent of recorded time.     An APAP prescription has been written and patient will be contacted by office staff regarding follow-up  in 2-3 months after initiation of therapy.    Encounter Diagnosis   Name Primary?    JOCELYNE (obstructive sleep apnea) Yes       Orders Placed This Encounter   Procedures    DME Order for (Specify) as OP     - DME device ordered - ResMed Device with Heated Humidifer  / .   - Diagnosis: Obstructive Sleep Apnea (G47.33)  - Length of Need: Lifetime    Pressure Settin - 15 cmH2O     Nasal Cushion (Replace) 2 per month.     Nasal Interface Mask 1 every 3 months.    Headgear 1 every 6 months.     Filter(s) Disposable 2 per month.   Filter(s) Non-Disposable 1 every 6 months.      Water Chamber for Humidifier (Replace) 1 every 6 months.   Tubing with heating element 1 every 3 months.    Perform Mask Fitting per patient preference and comfort - replace as above.      Jing Crespo MD, FAASM; NPI: [id number]  Electronically signed. 3/30/2024

## 2024-04-01 ENCOUNTER — TELEPHONE (OUTPATIENT)
Age: 59
End: 2024-04-01

## 2024-04-03 ENCOUNTER — TELEPHONE (OUTPATIENT)
Age: 59
End: 2024-04-03

## 2024-04-03 NOTE — TELEPHONE ENCOUNTER
Reviewed sleep study results with patient. She expressed understanding and is willing to proceed with a trial of APAP.

## 2024-04-08 ENCOUNTER — CLINICAL DOCUMENTATION (OUTPATIENT)
Age: 59
End: 2024-04-08

## 2024-12-03 RX ORDER — SERTRALINE HYDROCHLORIDE 25 MG/1
25 TABLET, FILM COATED ORAL EVERY MORNING
Qty: 90 TABLET | Refills: 0 | Status: SHIPPED | OUTPATIENT
Start: 2024-12-03

## 2024-12-24 ENCOUNTER — OFFICE VISIT (OUTPATIENT)
Facility: CLINIC | Age: 59
End: 2024-12-24

## 2024-12-24 VITALS
HEIGHT: 63 IN | BODY MASS INDEX: 35.97 KG/M2 | TEMPERATURE: 98.3 F | SYSTOLIC BLOOD PRESSURE: 131 MMHG | HEART RATE: 87 BPM | OXYGEN SATURATION: 97 % | WEIGHT: 203 LBS | RESPIRATION RATE: 17 BRPM | DIASTOLIC BLOOD PRESSURE: 88 MMHG

## 2024-12-24 DIAGNOSIS — Z00.00 WELLNESS EXAMINATION: Primary | ICD-10-CM

## 2024-12-24 DIAGNOSIS — R10.30 LOWER ABDOMINAL PAIN: ICD-10-CM

## 2024-12-24 DIAGNOSIS — Z88.7 ALLERGY TO INFLUENZA VACCINE: ICD-10-CM

## 2024-12-24 DIAGNOSIS — H01.003 BLEPHARITIS OF BOTH EYES, UNSPECIFIED EYELID, UNSPECIFIED TYPE: ICD-10-CM

## 2024-12-24 DIAGNOSIS — L30.9 ECZEMA, UNSPECIFIED TYPE: ICD-10-CM

## 2024-12-24 DIAGNOSIS — Z12.4 CERVICAL CANCER SCREENING: ICD-10-CM

## 2024-12-24 DIAGNOSIS — G47.33 OBSTRUCTIVE SLEEP APNEA: ICD-10-CM

## 2024-12-24 DIAGNOSIS — F32.1 DEPRESSION, MAJOR, SINGLE EPISODE, MODERATE (HCC): ICD-10-CM

## 2024-12-24 DIAGNOSIS — Z12.11 SCREENING FOR COLON CANCER: ICD-10-CM

## 2024-12-24 DIAGNOSIS — Z12.39 ENCOUNTER FOR SCREENING FOR MALIGNANT NEOPLASM OF BREAST, UNSPECIFIED SCREENING MODALITY: ICD-10-CM

## 2024-12-24 DIAGNOSIS — H01.006 BLEPHARITIS OF BOTH EYES, UNSPECIFIED EYELID, UNSPECIFIED TYPE: ICD-10-CM

## 2024-12-24 DIAGNOSIS — Z11.59 ENCOUNTER FOR HEPATITIS C SCREENING TEST FOR LOW RISK PATIENT: ICD-10-CM

## 2024-12-24 DIAGNOSIS — Z13.220 SCREENING CHOLESTEROL LEVEL: ICD-10-CM

## 2024-12-24 DIAGNOSIS — M76.61 ACHILLES TENDINITIS OF RIGHT LOWER EXTREMITY: ICD-10-CM

## 2024-12-24 SDOH — ECONOMIC STABILITY: FOOD INSECURITY: WITHIN THE PAST 12 MONTHS, YOU WORRIED THAT YOUR FOOD WOULD RUN OUT BEFORE YOU GOT MONEY TO BUY MORE.: NEVER TRUE

## 2024-12-24 SDOH — ECONOMIC STABILITY: FOOD INSECURITY: WITHIN THE PAST 12 MONTHS, THE FOOD YOU BOUGHT JUST DIDN'T LAST AND YOU DIDN'T HAVE MONEY TO GET MORE.: NEVER TRUE

## 2024-12-24 SDOH — ECONOMIC STABILITY: INCOME INSECURITY: HOW HARD IS IT FOR YOU TO PAY FOR THE VERY BASICS LIKE FOOD, HOUSING, MEDICAL CARE, AND HEATING?: NOT HARD AT ALL

## 2024-12-24 ASSESSMENT — PATIENT HEALTH QUESTIONNAIRE - PHQ9
SUM OF ALL RESPONSES TO PHQ QUESTIONS 1-9: 0
1. LITTLE INTEREST OR PLEASURE IN DOING THINGS: NOT AT ALL
2. FEELING DOWN, DEPRESSED OR HOPELESS: NOT AT ALL
SUM OF ALL RESPONSES TO PHQ QUESTIONS 1-9: 0
3. TROUBLE FALLING OR STAYING ASLEEP: NOT AT ALL
4. FEELING TIRED OR HAVING LITTLE ENERGY: NOT AT ALL
10. IF YOU CHECKED OFF ANY PROBLEMS, HOW DIFFICULT HAVE THESE PROBLEMS MADE IT FOR YOU TO DO YOUR WORK, TAKE CARE OF THINGS AT HOME, OR GET ALONG WITH OTHER PEOPLE: NOT DIFFICULT AT ALL
7. TROUBLE CONCENTRATING ON THINGS, SUCH AS READING THE NEWSPAPER OR WATCHING TELEVISION: NOT AT ALL
SUM OF ALL RESPONSES TO PHQ QUESTIONS 1-9: 0
5. POOR APPETITE OR OVEREATING: NOT AT ALL
9. THOUGHTS THAT YOU WOULD BE BETTER OFF DEAD, OR OF HURTING YOURSELF: NOT AT ALL
8. MOVING OR SPEAKING SO SLOWLY THAT OTHER PEOPLE COULD HAVE NOTICED. OR THE OPPOSITE, BEING SO FIGETY OR RESTLESS THAT YOU HAVE BEEN MOVING AROUND A LOT MORE THAN USUAL: NOT AT ALL
SUM OF ALL RESPONSES TO PHQ9 QUESTIONS 1 & 2: 0
6. FEELING BAD ABOUT YOURSELF - OR THAT YOU ARE A FAILURE OR HAVE LET YOURSELF OR YOUR FAMILY DOWN: NOT AT ALL
SUM OF ALL RESPONSES TO PHQ QUESTIONS 1-9: 0

## 2024-12-25 LAB
ALBUMIN SERPL-MCNC: 4.6 G/DL (ref 3.9–4.9)
ALP SERPL-CCNC: 75 IU/L (ref 44–121)
ALT SERPL-CCNC: 35 IU/L (ref 0–32)
AST SERPL-CCNC: 24 IU/L (ref 0–40)
BASOPHILS # BLD AUTO: 0.1 X10E3/UL (ref 0–0.2)
BASOPHILS NFR BLD AUTO: 1 %
BILIRUB SERPL-MCNC: 1 MG/DL (ref 0–1.2)
BUN SERPL-MCNC: 14 MG/DL (ref 8–27)
BUN/CREAT SERPL: 19 (ref 12–28)
CALCIUM SERPL-MCNC: 9.6 MG/DL (ref 8.7–10.3)
CHLORIDE SERPL-SCNC: 107 MMOL/L (ref 96–106)
CHOLEST SERPL-MCNC: 168 MG/DL (ref 100–199)
CO2 SERPL-SCNC: 20 MMOL/L (ref 20–29)
CREAT SERPL-MCNC: 0.73 MG/DL (ref 0.57–1)
EGFRCR SERPLBLD CKD-EPI 2021: 94 ML/MIN/1.73
EOSINOPHIL # BLD AUTO: 0.3 X10E3/UL (ref 0–0.4)
EOSINOPHIL NFR BLD AUTO: 5 %
ERYTHROCYTE [DISTWIDTH] IN BLOOD BY AUTOMATED COUNT: 12.9 % (ref 11.7–15.4)
GLOBULIN SER CALC-MCNC: 2.6 G/DL (ref 1.5–4.5)
GLUCOSE SERPL-MCNC: 116 MG/DL (ref 70–99)
HCT VFR BLD AUTO: 49.1 % (ref 34–46.6)
HCV AB SERPL QL IA: NORMAL
HCV IGG SERPL QL IA: NON REACTIVE
HDLC SERPL-MCNC: 68 MG/DL
HGB BLD-MCNC: 16.1 G/DL (ref 11.1–15.9)
HIV 1+2 AB+HIV1 P24 AG SERPL QL IA: NON REACTIVE
IMM GRANULOCYTES # BLD AUTO: 0 X10E3/UL (ref 0–0.1)
IMM GRANULOCYTES NFR BLD AUTO: 0 %
IMP & REVIEW OF LAB RESULTS: NORMAL
LDLC SERPL CALC-MCNC: 81 MG/DL (ref 0–99)
LYMPHOCYTES # BLD AUTO: 1.4 X10E3/UL (ref 0.7–3.1)
LYMPHOCYTES NFR BLD AUTO: 27 %
MCH RBC QN AUTO: 31 PG (ref 26.6–33)
MCHC RBC AUTO-ENTMCNC: 32.8 G/DL (ref 31.5–35.7)
MCV RBC AUTO: 94 FL (ref 79–97)
MONOCYTES # BLD AUTO: 0.4 X10E3/UL (ref 0.1–0.9)
MONOCYTES NFR BLD AUTO: 8 %
NEUTROPHILS # BLD AUTO: 3 X10E3/UL (ref 1.4–7)
NEUTROPHILS NFR BLD AUTO: 59 %
PLATELET # BLD AUTO: 218 X10E3/UL (ref 150–450)
POTASSIUM SERPL-SCNC: 4.5 MMOL/L (ref 3.5–5.2)
PROT SERPL-MCNC: 7.2 G/DL (ref 6–8.5)
RBC # BLD AUTO: 5.2 X10E6/UL (ref 3.77–5.28)
SODIUM SERPL-SCNC: 142 MMOL/L (ref 134–144)
SPECIMEN STATUS REPORT: NORMAL
TRIGL SERPL-MCNC: 107 MG/DL (ref 0–149)
TSH SERPL DL<=0.005 MIU/L-ACNC: 1.53 UIU/ML (ref 0.45–4.5)
VLDLC SERPL CALC-MCNC: 19 MG/DL (ref 5–40)
WBC # BLD AUTO: 5.2 X10E3/UL (ref 3.4–10.8)

## 2025-01-03 RX ORDER — TRIAMCINOLONE ACETONIDE 1 MG/G
CREAM TOPICAL
Qty: 30 G | Refills: 3 | Status: SHIPPED | OUTPATIENT
Start: 2025-01-03

## 2025-03-20 ENCOUNTER — OFFICE VISIT (OUTPATIENT)
Facility: CLINIC | Age: 60
End: 2025-03-20

## 2025-03-20 VITALS
RESPIRATION RATE: 14 BRPM | OXYGEN SATURATION: 96 % | TEMPERATURE: 98.1 F | HEIGHT: 63 IN | WEIGHT: 196.4 LBS | HEART RATE: 89 BPM | SYSTOLIC BLOOD PRESSURE: 136 MMHG | DIASTOLIC BLOOD PRESSURE: 81 MMHG | BODY MASS INDEX: 34.8 KG/M2

## 2025-03-20 DIAGNOSIS — R29.898 BILATERAL LEG WEAKNESS: ICD-10-CM

## 2025-03-20 DIAGNOSIS — F32.1 DEPRESSION, MAJOR, SINGLE EPISODE, MODERATE (HCC): ICD-10-CM

## 2025-03-20 DIAGNOSIS — Z91.81 AT HIGH RISK FOR FALLS: Primary | ICD-10-CM

## 2025-03-20 DIAGNOSIS — U09.9 LONG COVID: ICD-10-CM

## 2025-03-20 SDOH — ECONOMIC STABILITY: FOOD INSECURITY: WITHIN THE PAST 12 MONTHS, THE FOOD YOU BOUGHT JUST DIDN'T LAST AND YOU DIDN'T HAVE MONEY TO GET MORE.: NEVER TRUE

## 2025-03-20 SDOH — ECONOMIC STABILITY: FOOD INSECURITY: WITHIN THE PAST 12 MONTHS, YOU WORRIED THAT YOUR FOOD WOULD RUN OUT BEFORE YOU GOT MONEY TO BUY MORE.: NEVER TRUE

## 2025-03-20 ASSESSMENT — PATIENT HEALTH QUESTIONNAIRE - PHQ9
SUM OF ALL RESPONSES TO PHQ QUESTIONS 1-9: 0
1. LITTLE INTEREST OR PLEASURE IN DOING THINGS: NOT AT ALL
2. FEELING DOWN, DEPRESSED OR HOPELESS: NOT AT ALL
8. MOVING OR SPEAKING SO SLOWLY THAT OTHER PEOPLE COULD HAVE NOTICED. OR THE OPPOSITE, BEING SO FIGETY OR RESTLESS THAT YOU HAVE BEEN MOVING AROUND A LOT MORE THAN USUAL: NOT AT ALL
5. POOR APPETITE OR OVEREATING: NOT AT ALL
3. TROUBLE FALLING OR STAYING ASLEEP: NOT AT ALL
SUM OF ALL RESPONSES TO PHQ QUESTIONS 1-9: 0
4. FEELING TIRED OR HAVING LITTLE ENERGY: NOT AT ALL
SUM OF ALL RESPONSES TO PHQ QUESTIONS 1-9: 0
SUM OF ALL RESPONSES TO PHQ QUESTIONS 1-9: 0
6. FEELING BAD ABOUT YOURSELF - OR THAT YOU ARE A FAILURE OR HAVE LET YOURSELF OR YOUR FAMILY DOWN: NOT AT ALL
7. TROUBLE CONCENTRATING ON THINGS, SUCH AS READING THE NEWSPAPER OR WATCHING TELEVISION: NOT AT ALL
9. THOUGHTS THAT YOU WOULD BE BETTER OFF DEAD, OR OF HURTING YOURSELF: NOT AT ALL
10. IF YOU CHECKED OFF ANY PROBLEMS, HOW DIFFICULT HAVE THESE PROBLEMS MADE IT FOR YOU TO DO YOUR WORK, TAKE CARE OF THINGS AT HOME, OR GET ALONG WITH OTHER PEOPLE: NOT DIFFICULT AT ALL

## 2025-03-20 NOTE — PROGRESS NOTES
\"Have you been to the ER, urgent care clinic since your last visit?  Hospitalized since your last visit?\"    NO    “Have you seen or consulted any other health care providers outside our system since your last visit?”    NO    Have you had a mammogram?”   NO    Date of last Mammogram: 11/8/2019      “Have you had a pap smear?”    NO    No cervical cancer screening on file       “Have you had a colorectal cancer screening such as a colonoscopy/FIT/Cologuard?    NO    No colonoscopy on file  No cologuard on file  Date of last FIT: 7/30/2022   No flexible sigmoidoscopy on file              Chief Complaint   Patient presents with    Follow-up     Would like to get a handicap placard. Patient is falling frequently.

## 2025-03-22 PROBLEM — U09.9 LONG COVID: Status: ACTIVE | Noted: 2021-03-21

## 2025-03-22 NOTE — PROGRESS NOTES
Assessment/Plan:        Assessment & Plan  1. Gait disturbance.  She reports difficulty walking, frequent falls, and a shuffling gait since her COVID-19 infection in 2020. Despite performing stretches and exercises, her symptoms have not improved. A referral to physiatry will be made for further evaluation and management. Additionally, a referral for physical therapy will be provided to address her gait issues and prevent future falls. A handicap placard will be issued to assist with mobility.  Needs to follow-up with physiatry and the physical therapist to get healthy again.  She needs to discuss with her insurance company payment for the services rendered since her life expectancy will be reduced if something is not done about this    2. Achilles tendon tightness.  She experiences tightness in her Achilles tendons, contributing to her gait disturbance and falls. Physical therapy will be recommended to provide targeted stretches and exercises to alleviate the tightness. If symptoms persist, further evaluation by a physiatrist will be necessary.    3. Health maintenance.  She is advised to follow up on the referral for a colonoscopy, especially given her history of hemorrhoids and diverticulitis. A mammogram is scheduled for next Tuesday or Wednesday. A referral for colon cancer screening will be provided.    4. Medication management.  Depression.  She is currently taking sertraline at a lower dose but may switch to a higher dose due to recent stress from the loss of her grandson. She is advised to continue her current medication regimen and adjust the dose as needed based on her symptoms.    5. Toenail injury.  The toenail will eventually fall off.    Follow-up  The patient will follow up in 3 months.    Results        ICD-10-CM    1. At high risk for falls  Z91.81 Drake Avila MD, [facility] [place] ([facility])     Amb External Referral To Physical Therapy      2. Bilateral leg weakness

## 2025-06-17 ENCOUNTER — TELEMEDICINE (OUTPATIENT)
Facility: CLINIC | Age: 60
End: 2025-06-17
Payer: COMMERCIAL

## 2025-06-17 DIAGNOSIS — Z13.1 SCREENING FOR DIABETES MELLITUS: ICD-10-CM

## 2025-06-17 DIAGNOSIS — L03.211 CELLULITIS, FACE: Primary | ICD-10-CM

## 2025-06-17 DIAGNOSIS — Z12.11 SCREENING FOR COLON CANCER: ICD-10-CM

## 2025-06-17 PROCEDURE — 99214 OFFICE O/P EST MOD 30 MIN: CPT | Performed by: FAMILY MEDICINE

## 2025-06-17 RX ORDER — CEFUROXIME AXETIL 500 MG/1
500 TABLET ORAL 2 TIMES DAILY
Qty: 14 TABLET | Refills: 0 | Status: SHIPPED | OUTPATIENT
Start: 2025-06-17 | End: 2025-06-24

## 2025-06-17 ASSESSMENT — PATIENT HEALTH QUESTIONNAIRE - PHQ9
2. FEELING DOWN, DEPRESSED OR HOPELESS: NOT AT ALL
3. TROUBLE FALLING OR STAYING ASLEEP: NOT AT ALL
1. LITTLE INTEREST OR PLEASURE IN DOING THINGS: NOT AT ALL
7. TROUBLE CONCENTRATING ON THINGS, SUCH AS READING THE NEWSPAPER OR WATCHING TELEVISION: NOT AT ALL
SUM OF ALL RESPONSES TO PHQ QUESTIONS 1-9: 0
9. THOUGHTS THAT YOU WOULD BE BETTER OFF DEAD, OR OF HURTING YOURSELF: NOT AT ALL
5. POOR APPETITE OR OVEREATING: NOT AT ALL
6. FEELING BAD ABOUT YOURSELF - OR THAT YOU ARE A FAILURE OR HAVE LET YOURSELF OR YOUR FAMILY DOWN: NOT AT ALL
4. FEELING TIRED OR HAVING LITTLE ENERGY: NOT AT ALL
SUM OF ALL RESPONSES TO PHQ QUESTIONS 1-9: 0
10. IF YOU CHECKED OFF ANY PROBLEMS, HOW DIFFICULT HAVE THESE PROBLEMS MADE IT FOR YOU TO DO YOUR WORK, TAKE CARE OF THINGS AT HOME, OR GET ALONG WITH OTHER PEOPLE: NOT DIFFICULT AT ALL
8. MOVING OR SPEAKING SO SLOWLY THAT OTHER PEOPLE COULD HAVE NOTICED. OR THE OPPOSITE, BEING SO FIGETY OR RESTLESS THAT YOU HAVE BEEN MOVING AROUND A LOT MORE THAN USUAL: NOT AT ALL

## 2025-06-17 NOTE — PROGRESS NOTES
Have you been to the ER, urgent care clinic since your last visit?  Hospitalized since your last visit?   NO    Have you seen or consulted any other health care providers outside our system since your last visit?   NO     “Have you had a pap smear?”    NO    No cervical cancer screening on file       “Have you had a colorectal cancer screening such as a colonoscopy/FIT/Cologuard?    NO    No colonoscopy on file  No cologuard on file  Date of last FIT: 7/30/2022   No flexible sigmoidoscopy on file              Chief Complaint   Patient presents with    Eye Problem     Both eyes, Rash around eyes. Got sunburn / and she also thinks her cpap is effecting it as well. Very red, and was itchy at first. Started on Sunday. Woke up and couldn't see. Swelling, effecting your eye sight.  Been trying to use an antibiotic soap and it has been helping.       
to the area above it. She does not report any itching or rubbing of the eyes. She describes a sensation of pressure that appears to be causing skin stretching and dryness. She has been using a soap that resembles Hibiscus, which seems to have improved the condition. She is considering the use of an antibiotic to further alleviate the symptoms.    She continues to experience occasional falls and has ceased running and dancing activities. She is currently focusing on strengthening her leg muscles. Due to recent staff shortages at her workplace, she has been unable to schedule appointments with a therapist or physiatrist.    She expresses concern about potential cancer risk, given her family history of stomach cancer in a cousin who recently passed away. She is interested in undergoing tests to screen for cancer. She has previously undergone FIT kit testing and is considering a colonoscopy due to her history of hemorrhoids and diverticulitis. She recently had a mammogram performed by her gynecologist, Dr. Santiago.    FAMILY HISTORY  Her cousin had stomach cancer and  2 days after being diagnosed.       Social history reviewed in chart.    Objective   Appears to be in no acute distress, grossly 2 through 12 are nonfocal.            --Jayce Navarro MD

## 2025-06-18 ENCOUNTER — TELEPHONE (OUTPATIENT)
Facility: CLINIC | Age: 60
End: 2025-06-18

## 2025-06-18 DIAGNOSIS — L03.211 CELLULITIS, FACE: Primary | ICD-10-CM

## 2025-06-18 NOTE — TELEPHONE ENCOUNTER
Patient stated that she has broken out in hives from the antibiotic she would like something else called in

## 2025-07-01 ENCOUNTER — RESULTS FOLLOW-UP (OUTPATIENT)
Facility: CLINIC | Age: 60
End: 2025-07-01

## 2025-07-01 LAB — NONINV COLON CA DNA+OCC BLD SCRN STL QL: NEGATIVE

## 2025-07-26 LAB
BASOPHILS # BLD AUTO: 0.1 X10E3/UL (ref 0–0.2)
BASOPHILS NFR BLD AUTO: 1 %
EOSINOPHIL # BLD AUTO: 0.2 X10E3/UL (ref 0–0.4)
EOSINOPHIL NFR BLD AUTO: 3 %
ERYTHROCYTE [DISTWIDTH] IN BLOOD BY AUTOMATED COUNT: 12.6 % (ref 11.7–15.4)
HCT VFR BLD AUTO: 51 % (ref 34–46.6)
HGB BLD-MCNC: 16.7 G/DL (ref 11.1–15.9)
IMM GRANULOCYTES # BLD AUTO: 0 X10E3/UL (ref 0–0.1)
IMM GRANULOCYTES NFR BLD AUTO: 0 %
LYMPHOCYTES # BLD AUTO: 1.6 X10E3/UL (ref 0.7–3.1)
LYMPHOCYTES NFR BLD AUTO: 23 %
MCH RBC QN AUTO: 31.5 PG (ref 26.6–33)
MCHC RBC AUTO-ENTMCNC: 32.7 G/DL (ref 31.5–35.7)
MCV RBC AUTO: 96 FL (ref 79–97)
MONOCYTES # BLD AUTO: 0.5 X10E3/UL (ref 0.1–0.9)
MONOCYTES NFR BLD AUTO: 8 %
NEUTROPHILS # BLD AUTO: 4.7 X10E3/UL (ref 1.4–7)
NEUTROPHILS NFR BLD AUTO: 65 %
PLATELET # BLD AUTO: 248 X10E3/UL (ref 150–450)
RBC # BLD AUTO: 5.3 X10E6/UL (ref 3.77–5.28)
WBC # BLD AUTO: 7.2 X10E3/UL (ref 3.4–10.8)

## 2025-07-29 LAB
ALBUMIN SERPL-MCNC: 5 G/DL (ref 3.9–4.9)
ALP SERPL-CCNC: 82 IU/L (ref 44–121)
ALT SERPL-CCNC: 38 IU/L (ref 0–32)
AST SERPL-CCNC: 32 IU/L (ref 0–40)
BILIRUB SERPL-MCNC: 0.9 MG/DL (ref 0–1.2)
BUN SERPL-MCNC: 18 MG/DL (ref 8–27)
BUN/CREAT SERPL: 20 (ref 12–28)
CALCIUM SERPL-MCNC: 9.9 MG/DL (ref 8.7–10.3)
CHLORIDE SERPL-SCNC: 108 MMOL/L (ref 96–106)
CO2 SERPL-SCNC: ABNORMAL MMOL/L
CREAT SERPL-MCNC: 0.92 MG/DL (ref 0.57–1)
EGFRCR SERPLBLD CKD-EPI 2021: 71 ML/MIN/1.73
GLOBULIN SER CALC-MCNC: 2.4 G/DL (ref 1.5–4.5)
GLUCOSE SERPL-MCNC: 125 MG/DL (ref 70–99)
POTASSIUM SERPL-SCNC: 4.4 MMOL/L (ref 3.5–5.2)
PROT SERPL-MCNC: 7.4 G/DL (ref 6–8.5)
SODIUM SERPL-SCNC: 142 MMOL/L (ref 134–144)